# Patient Record
Sex: MALE | Race: WHITE | Employment: FULL TIME | URBAN - METROPOLITAN AREA
[De-identification: names, ages, dates, MRNs, and addresses within clinical notes are randomized per-mention and may not be internally consistent; named-entity substitution may affect disease eponyms.]

---

## 2019-09-26 ENCOUNTER — APPOINTMENT (OUTPATIENT)
Dept: CT IMAGING | Age: 54
End: 2019-09-26
Attending: EMERGENCY MEDICINE
Payer: COMMERCIAL

## 2019-09-26 ENCOUNTER — HOSPITAL ENCOUNTER (EMERGENCY)
Age: 54
Discharge: HOME | End: 2019-09-26
Attending: EMERGENCY MEDICINE | Admitting: EMERGENCY MEDICINE
Payer: COMMERCIAL

## 2019-09-26 VITALS
SYSTOLIC BLOOD PRESSURE: 117 MMHG | HEART RATE: 107 BPM | OXYGEN SATURATION: 99 % | RESPIRATION RATE: 17 BRPM | DIASTOLIC BLOOD PRESSURE: 86 MMHG | TEMPERATURE: 99 F

## 2019-09-26 DIAGNOSIS — S00.83XA CONTUSION OF FACE, INITIAL ENCOUNTER: ICD-10-CM
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
## 2019-09-26 PROCEDURE — 70450 CT HEAD/BRAIN W/O DYE: CPT

## 2019-09-26 PROCEDURE — 74011250637 HC RX REV CODE- 250/637: Performed by: EMERGENCY MEDICINE

## 2019-09-26 PROCEDURE — 99284 EMERGENCY DEPT VISIT MOD MDM: CPT

## 2019-09-26 PROCEDURE — 70480 CT ORBIT/EAR/FOSSA W/O DYE: CPT

## 2019-09-26 RX ORDER — IBUPROFEN 600 MG/1
600 TABLET ORAL
Status: COMPLETED | OUTPATIENT
Start: 2019-09-26 | End: 2019-09-26

## 2019-09-26 RX ADMIN — IBUPROFEN 600 MG: 600 TABLET, FILM COATED ORAL at 16:56

## 2019-09-26 NOTE — DISCHARGE INSTRUCTIONS
Advil, aleve, tylenol  for pain. Keep head elevated for next 48 hours. Place ice in a bag and apply to eyes  for 20 minutes 4-5 times a day for next 48 hours. Return to ER for any redness, warmth, increased swelling, vomiting, dizziness, lightheadedness. Head Injury: Care Instructions  Your Care Instructions    Most injuries to the head are minor. Bumps, cuts, and scrapes on the head and face usually heal well and can be treated the same as injuries to other parts of the body. Although it's rare, once in a while a more serious problem shows up after you are home. So it's good to be on the lookout for symptoms for a day or two. Follow-up care is a key part of your treatment and safety. Be sure to make and go to all appointments, and call your doctor if you are having problems. It's also a good idea to know your test results and keep a list of the medicines you take. How can you care for yourself at home? · Follow your doctor's instructions. He or she will tell you if you need someone to watch you closely for the next 24 hours or longer. · Take it easy for the next few days or more if you are not feeling well. · Ask your doctor when it's okay for you to go back to activities like driving a car, riding a bike, or operating machinery. When should you call for help? Call 911 anytime you think you may need emergency care. For example, call if:    · You have a seizure.     · You passed out (lost consciousness).     · You are confused or can't stay awake.    Call your doctor now or seek immediate medical care if:    · You have new or worse vomiting.     · You feel less alert.     · You have new weakness or numbness in any part of your body.    Watch closely for changes in your health, and be sure to contact your doctor if:    · You do not get better as expected.     · You have new symptoms, such as headaches, trouble concentrating, or changes in mood. Where can you learn more?   Go to http://antonio-wesly.info/. Enter R312 in the search box to learn more about \"Head Injury: Care Instructions. \"  Current as of: March 28, 2019  Content Version: 12.2  © 7938-7250 [employer]. Care instructions adapted under license by [employer] (which disclaims liability or warranty for this information). If you have questions about a medical condition or this instruction, always ask your healthcare professional. [place] [address] any warranty or liability for your use of this information. Bruised Face: Care Instructions  Your Care Instructions    You can get a bruise on your face if you fall or if something hits you in the face. The medical term for a bruise is \"contusion. \" Small blood vessels get torn and leak blood under the skin. Most people think of a bruise as a black-and-blue spot. But bones and muscles can also get bruised. This may damage deep tissues but not cause a bruise you can see. Your doctor will examine you and will gently press on your face to find areas that are tender. He or she will check your eyes, how well you can move face muscles near the bruise, and your feeling around the area to make sure there isn't a more serious injury, such as a broken bone or nerve damage. You may have tests, including X-rays or other imaging tests like a CT scan. Bruises may cause pain and swelling. But if there is no other damage, they will usually get better in a few weeks with home treatment. Follow-up care is a key part of your treatment and safety. Be sure to make and go to all appointments, and call your doctor if you are having problems. It's also a good idea to know your test results and keep a list of the medicines you take. How can you care for yourself at home? · Put ice or a cold pack on your face for 10 to 20 minutes at a time. Put a thin cloth between the ice and your skin.  Try to do this every 1 to 2 hours for the first 3 days (when you are awake) or until the swelling goes down. · Sleep with your head slightly raised until the swelling goes down. Prop up your head and shoulders on pillows. · If you play contact sports, ask your doctor when it's okay to play again. It's safest to wait at least 6 weeks. · Be safe with medicines. Read and follow all instructions on the label. ? If the doctor gave you a prescription medicine for pain, take it as prescribed. ? If you are not taking a prescription pain medicine, ask your doctor if you can take an over-the-counter medicine. When should you call for help? Call your doctor now or seek immediate medical care if:    · Your pain gets worse.     · You have new or worse swelling.     · You have new or worse bleeding.     · The area near the bruise is tingly, weak, or numb.     · You have vision changes.    Watch closely for changes in your health, and be sure to contact your doctor if:    · You do not get better as expected. Where can you learn more? Go to http://antonio-wesly.info/. Enter T266 in the search box to learn more about \"Bruised Face: Care Instructions. \"  Current as of: June 26, 2019  Content Version: 12.2  © 1260-9617 [employer]. Care instructions adapted under license by [employer] (which disclaims liability or warranty for this information). If you have questions about a medical condition or this instruction, always ask your healthcare professional. [place] [address] any warranty or liability for your use of this information.
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
## 2020-02-28 ENCOUNTER — HOSPITAL ENCOUNTER (INPATIENT)
Age: 55
LOS: 5 days | Discharge: HOME | DRG: 316 | End: 2020-03-04
Attending: STUDENT IN AN ORGANIZED HEALTH CARE EDUCATION/TRAINING PROGRAM | Admitting: FAMILY MEDICINE
Payer: MEDICAID

## 2020-02-28 ENCOUNTER — APPOINTMENT (OUTPATIENT)
Dept: VASCULAR SURGERY | Age: 55
DRG: 316 | End: 2020-02-28
Attending: FAMILY MEDICINE
Payer: MEDICAID

## 2020-02-28 ENCOUNTER — APPOINTMENT (OUTPATIENT)
Dept: GENERAL RADIOLOGY | Age: 55
DRG: 316 | End: 2020-02-28
Attending: EMERGENCY MEDICINE
Payer: MEDICAID

## 2020-02-28 DIAGNOSIS — L03.119 CELLULITIS OF HAND: Primary | ICD-10-CM

## 2020-02-28 DIAGNOSIS — M65.9 TENOSYNOVITIS: ICD-10-CM

## 2020-02-28 PROBLEM — L03.90 CELLULITIS: Status: ACTIVE | Noted: 2020-02-28

## 2020-02-28 LAB
ALBUMIN SERPL-MCNC: 4 G/DL (ref 3.5–5)
ALBUMIN/GLOB SERPL: 1.1 {RATIO} (ref 1.1–2.2)
ALP SERPL-CCNC: 101 U/L (ref 45–117)
ALT SERPL-CCNC: 20 U/L (ref 12–78)
ANION GAP SERPL CALC-SCNC: 0 MMOL/L (ref 5–15)
AST SERPL-CCNC: 8 U/L (ref 15–37)
BASOPHILS # BLD: 0 K/UL (ref 0–0.1)
BASOPHILS NFR BLD: 0 % (ref 0–1)
BILIRUB SERPL-MCNC: 1.3 MG/DL (ref 0.2–1)
BUN SERPL-MCNC: 13 MG/DL (ref 6–20)
BUN/CREAT SERPL: 11 (ref 12–20)
CALCIUM SERPL-MCNC: 8.7 MG/DL (ref 8.5–10.1)
CHLORIDE SERPL-SCNC: 104 MMOL/L (ref 97–108)
CO2 SERPL-SCNC: 31 MMOL/L (ref 21–32)
CREAT SERPL-MCNC: 1.17 MG/DL (ref 0.7–1.3)
DIFFERENTIAL METHOD BLD: ABNORMAL
EOSINOPHIL # BLD: 0.1 K/UL (ref 0–0.4)
EOSINOPHIL NFR BLD: 1 % (ref 0–7)
ERYTHROCYTE [DISTWIDTH] IN BLOOD BY AUTOMATED COUNT: 14.9 % (ref 11.5–14.5)
GLOBULIN SER CALC-MCNC: 3.7 G/DL (ref 2–4)
GLUCOSE SERPL-MCNC: 97 MG/DL (ref 65–100)
HCT VFR BLD AUTO: 44.1 % (ref 36.6–50.3)
HGB BLD-MCNC: 14.2 G/DL (ref 12.1–17)
IMM GRANULOCYTES # BLD AUTO: 0.1 K/UL (ref 0–0.04)
IMM GRANULOCYTES NFR BLD AUTO: 1 % (ref 0–0.5)
LYMPHOCYTES # BLD: 1.4 K/UL (ref 0.8–3.5)
LYMPHOCYTES NFR BLD: 12 % (ref 12–49)
MCH RBC QN AUTO: 32.3 PG (ref 26–34)
MCHC RBC AUTO-ENTMCNC: 32.2 G/DL (ref 30–36.5)
MCV RBC AUTO: 100.5 FL (ref 80–99)
MONOCYTES # BLD: 0.8 K/UL (ref 0–1)
MONOCYTES NFR BLD: 6 % (ref 5–13)
NEUTS SEG # BLD: 9.9 K/UL (ref 1.8–8)
NEUTS SEG NFR BLD: 80 % (ref 32–75)
NRBC # BLD: 0 K/UL (ref 0–0.01)
NRBC BLD-RTO: 0 PER 100 WBC
PLATELET # BLD AUTO: 167 K/UL (ref 150–400)
PMV BLD AUTO: 8.8 FL (ref 8.9–12.9)
POTASSIUM SERPL-SCNC: 4.1 MMOL/L (ref 3.5–5.1)
PROT SERPL-MCNC: 7.7 G/DL (ref 6.4–8.2)
RBC # BLD AUTO: 4.39 M/UL (ref 4.1–5.7)
SODIUM SERPL-SCNC: 135 MMOL/L (ref 136–145)
WBC # BLD AUTO: 12.2 K/UL (ref 4.1–11.1)

## 2020-02-28 PROCEDURE — 74011000258 HC RX REV CODE- 258: Performed by: EMERGENCY MEDICINE

## 2020-02-28 PROCEDURE — 36415 COLL VENOUS BLD VENIPUNCTURE: CPT

## 2020-02-28 PROCEDURE — 74011250636 HC RX REV CODE- 250/636: Performed by: FAMILY MEDICINE

## 2020-02-28 PROCEDURE — 74011000258 HC RX REV CODE- 258: Performed by: FAMILY MEDICINE

## 2020-02-28 PROCEDURE — 99283 EMERGENCY DEPT VISIT LOW MDM: CPT

## 2020-02-28 PROCEDURE — 80053 COMPREHEN METABOLIC PANEL: CPT

## 2020-02-28 PROCEDURE — 65270000029 HC RM PRIVATE

## 2020-02-28 PROCEDURE — 87040 BLOOD CULTURE FOR BACTERIA: CPT

## 2020-02-28 PROCEDURE — 74011250636 HC RX REV CODE- 250/636: Performed by: EMERGENCY MEDICINE

## 2020-02-28 PROCEDURE — 93971 EXTREMITY STUDY: CPT

## 2020-02-28 PROCEDURE — 74011250637 HC RX REV CODE- 250/637: Performed by: FAMILY MEDICINE

## 2020-02-28 PROCEDURE — 85025 COMPLETE CBC W/AUTO DIFF WBC: CPT

## 2020-02-28 PROCEDURE — 73130 X-RAY EXAM OF HAND: CPT

## 2020-02-28 RX ORDER — LORAZEPAM 0.5 MG/1
0.5 TABLET ORAL 2 TIMES DAILY
Status: DISCONTINUED | OUTPATIENT
Start: 2020-02-29 | End: 2020-03-04 | Stop reason: HOSPADM

## 2020-02-28 RX ORDER — VANCOMYCIN 1.75 GRAM/500 ML IN 0.9 % SODIUM CHLORIDE INTRAVENOUS
1750 ONCE
Status: COMPLETED | OUTPATIENT
Start: 2020-02-28 | End: 2020-02-29

## 2020-02-28 RX ORDER — SERTRALINE HYDROCHLORIDE 50 MG/1
200 TABLET, FILM COATED ORAL DAILY
Status: DISCONTINUED | OUTPATIENT
Start: 2020-02-29 | End: 2020-03-04 | Stop reason: HOSPADM

## 2020-02-28 RX ORDER — EMTRICITABINE AND TENOFOVIR DISOPROXIL FUMARATE 200; 300 MG/1; MG/1
1 TABLET, FILM COATED ORAL EVERY EVENING
Status: ON HOLD | COMMUNITY
End: 2022-04-02

## 2020-02-28 RX ORDER — MORPHINE SULFATE 2 MG/ML
1 INJECTION, SOLUTION INTRAMUSCULAR; INTRAVENOUS
Status: DISCONTINUED | OUTPATIENT
Start: 2020-02-28 | End: 2020-03-02

## 2020-02-28 RX ORDER — SODIUM CHLORIDE 0.9 % (FLUSH) 0.9 %
5-40 SYRINGE (ML) INJECTION EVERY 8 HOURS
Status: DISCONTINUED | OUTPATIENT
Start: 2020-02-28 | End: 2020-03-04 | Stop reason: HOSPADM

## 2020-02-28 RX ORDER — DEXTROAMPHETAMINE SACCHARATE, AMPHETAMINE ASPARTATE, DEXTROAMPHETAMINE SULFATE AND AMPHETAMINE SULFATE 5; 5; 5; 5 MG/1; MG/1; MG/1; MG/1
20 TABLET ORAL DAILY
COMMUNITY

## 2020-02-28 RX ORDER — DEXTROAMPHETAMINE SACCHARATE, AMPHETAMINE ASPARTATE, DEXTROAMPHETAMINE SULFATE AND AMPHETAMINE SULFATE 2.5; 2.5; 2.5; 2.5 MG/1; MG/1; MG/1; MG/1
20 TABLET ORAL DAILY
Status: DISCONTINUED | OUTPATIENT
Start: 2020-02-29 | End: 2020-03-04 | Stop reason: HOSPADM

## 2020-02-28 RX ORDER — DEXTROAMPHETAMINE SACCHARATE, AMPHETAMINE ASPARTATE, DEXTROAMPHETAMINE SULFATE AND AMPHETAMINE SULFATE 5; 5; 5; 5 MG/1; MG/1; MG/1; MG/1
20 TABLET ORAL
COMMUNITY

## 2020-02-28 RX ORDER — SODIUM CHLORIDE 0.9 % (FLUSH) 0.9 %
5-40 SYRINGE (ML) INJECTION AS NEEDED
Status: DISCONTINUED | OUTPATIENT
Start: 2020-02-28 | End: 2020-03-04 | Stop reason: HOSPADM

## 2020-02-28 RX ORDER — SERTRALINE HYDROCHLORIDE 100 MG/1
200 TABLET, FILM COATED ORAL DAILY
COMMUNITY

## 2020-02-28 RX ORDER — CETIRIZINE HCL 10 MG
10 TABLET ORAL
COMMUNITY

## 2020-02-28 RX ORDER — MORPHINE SULFATE 2 MG/ML
4 INJECTION, SOLUTION INTRAMUSCULAR; INTRAVENOUS
Status: COMPLETED | OUTPATIENT
Start: 2020-02-28 | End: 2020-02-28

## 2020-02-28 RX ORDER — EMTRICITABINE AND TENOFOVIR DISOPROXIL FUMARATE 200; 300 MG/1; MG/1
1 TABLET, FILM COATED ORAL EVERY EVENING
Status: DISCONTINUED | OUTPATIENT
Start: 2020-02-29 | End: 2020-02-29

## 2020-02-28 RX ORDER — ZOLPIDEM TARTRATE 10 MG/1
10 TABLET ORAL
COMMUNITY

## 2020-02-28 RX ORDER — SODIUM CHLORIDE 9 MG/ML
75 INJECTION, SOLUTION INTRAVENOUS CONTINUOUS
Status: DISCONTINUED | OUTPATIENT
Start: 2020-02-28 | End: 2020-03-02

## 2020-02-28 RX ORDER — HEPARIN SODIUM 5000 [USP'U]/ML
5000 INJECTION, SOLUTION INTRAVENOUS; SUBCUTANEOUS EVERY 8 HOURS
Status: DISCONTINUED | OUTPATIENT
Start: 2020-02-28 | End: 2020-03-04 | Stop reason: HOSPADM

## 2020-02-28 RX ORDER — ZOLPIDEM TARTRATE 5 MG/1
10 TABLET ORAL
Status: DISCONTINUED | OUTPATIENT
Start: 2020-02-28 | End: 2020-03-04 | Stop reason: HOSPADM

## 2020-02-28 RX ORDER — ACETAMINOPHEN 325 MG/1
650 TABLET ORAL
Status: DISCONTINUED | OUTPATIENT
Start: 2020-02-28 | End: 2020-03-04 | Stop reason: HOSPADM

## 2020-02-28 RX ORDER — LORAZEPAM 0.5 MG/1
0.5 TABLET ORAL 2 TIMES DAILY
COMMUNITY

## 2020-02-28 RX ADMIN — ZOLPIDEM TARTRATE 10 MG: 5 TABLET ORAL at 23:48

## 2020-02-28 RX ADMIN — CEFEPIME HYDROCHLORIDE 2 G: 2 INJECTION, POWDER, FOR SOLUTION INTRAVENOUS at 22:50

## 2020-02-28 RX ADMIN — ACETAMINOPHEN 650 MG: 325 TABLET ORAL at 23:58

## 2020-02-28 RX ADMIN — MORPHINE SULFATE 4 MG: 2 INJECTION, SOLUTION INTRAMUSCULAR; INTRAVENOUS at 20:23

## 2020-02-28 RX ADMIN — VANCOMYCIN HYDROCHLORIDE 1750 MG: 10 INJECTION, POWDER, LYOPHILIZED, FOR SOLUTION INTRAVENOUS at 23:48

## 2020-02-28 RX ADMIN — HEPARIN SODIUM 5000 UNITS: 5000 INJECTION INTRAVENOUS; SUBCUTANEOUS at 22:50

## 2020-02-28 RX ADMIN — SODIUM CHLORIDE 75 ML/HR: 900 INJECTION, SOLUTION INTRAVENOUS at 23:48

## 2020-02-28 RX ADMIN — PIPERACILLIN AND TAZOBACTAM 3.38 G: 3; .375 INJECTION, POWDER, LYOPHILIZED, FOR SOLUTION INTRAVENOUS at 20:23

## 2020-02-28 NOTE — ED PROVIDER NOTES
71-year-old male medical history significant for HIV and hepatitis presents to the emergency room for evaluation of right hand redness and swelling. Patient states it is been ongoing for 2 days. Patient is unaware of any injury or trauma. He is unaware of any bites to the hand. He is unaware of any fever or chills. No chest pain, shortness breath difficulty breathing. No nausea or vomiting. No numbness or tingling. He does report significant pain. Pain starts in the index finger and shoots up the arm. Patient reports swelling started in the index finger and is spread to the top and bottom of the hand. He is not taking anything for pain. No alleviating factors. Patient is right-hand dominant. Last CD4 count was 300 and last viral load was almost undetectable. Social hx  Nonsmoker  +alcohol      The history is provided by the patient. No  was used. Hand Swelling    Pertinent negatives include no back pain and no neck pain. Past Medical History:   Diagnosis Date    Hepatitis C     HIV infection ([facility]     Psychiatric disorder     depression       Past Surgical History:   Procedure Laterality Date    HX ADENOIDECTOMY           Family History:   Problem Relation Age of Onset    Hypertension Unknown        Social History     Socioeconomic History    Marital status: SINGLE     Spouse name: Not on file    Number of children: Not on file    Years of education: Not on file    Highest education level: Not on file   Occupational History    Not on file   Social Needs    Financial resource strain: Not on file    Food insecurity:     Worry: Not on file     Inability: Not on file    Transportation needs:     Medical: Not on file     Non-medical: Not on file   Tobacco Use    Smoking status: Never Smoker   Substance and Sexual Activity    Alcohol use:  Yes    Drug use: No    Sexual activity: Not on file   Lifestyle    Physical activity:     Days per week: Not on file Minutes per session: Not on file    Stress: Not on file   Relationships    Social connections:     Talks on phone: Not on file     Gets together: Not on file     Attends Latter day service: Not on file     Active member of club or organization: Not on file     Attends meetings of clubs or organizations: Not on file     Relationship status: Not on file    Intimate partner violence:     Fear of current or ex partner: Not on file     Emotionally abused: Not on file     Physically abused: Not on file     Forced sexual activity: Not on file   Other Topics Concern    Not on file   Social History Narrative    Not on file         ALLERGIES: Sulfa (sulfonamide antibiotics)    Review of Systems   Constitutional: Negative for chills and fever. Respiratory: Negative for cough, chest tightness and shortness of breath. Cardiovascular: Negative for chest pain. Gastrointestinal: Negative for abdominal pain, nausea and vomiting. Genitourinary: Negative for dysuria. Musculoskeletal: Negative for back pain and neck pain. Skin: Positive for color change. Negative for rash and wound. Neurological: Negative for headaches. Vitals:    02/28/20 1611   BP: 124/90   Pulse: 97   Resp: 18   Temp: 97.7 °F (36.5 °C)   SpO2: 98%            Physical Exam  Vitals signs and nursing note reviewed. Constitutional:       Appearance: Normal appearance. HENT:      Head: Normocephalic and atraumatic. Nose: Nose normal.      Mouth/Throat:      Mouth: Mucous membranes are moist.      Pharynx: Oropharynx is clear. Eyes:      Conjunctiva/sclera: Conjunctivae normal.      Pupils: Pupils are equal, round, and reactive to light. Neck:      Musculoskeletal: Normal range of motion and neck supple. Cardiovascular:      Rate and Rhythm: Normal rate and regular rhythm. Pulmonary:      Effort: Pulmonary effort is normal. No respiratory distress. Breath sounds: Normal breath sounds.    Musculoskeletal: Normal range of motion. Comments: Right hand:  + redness, warmth and swelling to index and middle  finger dorsum of hand mcp to pip joints and webspace between thumb and index finger, thenar eminence. + lymphangitis   Skin:     General: Skin is warm and dry. Findings: Erythema present. Neurological:      General: No focal deficit present. Mental Status: He is alert and oriented to person, place, and time. Psychiatric:         Mood and Affect: Mood normal.         Behavior: Behavior normal.         Thought Content: Thought content normal.         Judgment: Judgment normal.                  MDM  Number of Diagnoses or Management Options  Cellulitis of hand:   Diagnosis management comments: 48-year-old male HIV positive presenting for right hand redness and swelling. He is swelling over the right hand and flexor surface of the index finger. Patient will need x-ray, labs IV antibiotics admission Ortho consult.    7:02 PM  Spoke with orthopedic attending doctor walker, he will have orthopedic PA evaluate the hand    8:07 PM  Seen by ortho PA. Melissa Dickson. No surgical intervention at this time. Recommends IV antibiotics, admission with hospitalist. Concepción Sandoval will follow. 8:08 PM  Patient is being admitted to the hospital.  The results of their tests and reasons for their admission have been discussed with them and/or available family. They convey agreement and understanding for the need to be admitted and for their admission diagnosis. Consultation will be made now with the inpatient physician for hospitalization. Amount and/or Complexity of Data Reviewed  Discuss the patient with other providers: yes (ER attending-Chase)    Patient Progress  Patient progress: stable         Procedures      8:04 PM    ED Room Number: ER17/17  Patient Name and age:  Derick Oconnell 53 y.o.  male  Working Diagnosis:   1.  Cellulitis of hand      Readmission: no  Isolation Requirements:  no  Recommended Level of Care: med/surg  Code Status:  full  Other: HIV+. cd4 300, viral load almost undetectable. Progressively worsening. Wbc 12. + lymphangitis. Seen by shaniqua hernandez, Pasha Romo  Department:[facility] Adult ED - [phone]           Pt case including HPI, PE, and all available lab and radiology results has been discussed with attending physician. Opportunity to evaluate patient has been provided to ER attending.

## 2020-02-28 NOTE — ED TRIAGE NOTES
Triage: Pt arrives from home with CC of right hand swelling x3 days. Pt reports the swelling started in the second digit and has now spread to the hand and first 3 digits. The hand is red with obvious swelling. Pt reports pain to the extremity. Denies any specific injury or trauma. Took ibuprofen at 10am without relief.

## 2020-02-29 LAB
AMPHET UR QL SCN: POSITIVE
ANION GAP SERPL CALC-SCNC: 6 MMOL/L (ref 5–15)
BARBITURATES UR QL SCN: NEGATIVE
BASOPHILS # BLD: 0 K/UL (ref 0–0.1)
BASOPHILS NFR BLD: 0 % (ref 0–1)
BENZODIAZ UR QL: NEGATIVE
BUN SERPL-MCNC: 13 MG/DL (ref 6–20)
BUN/CREAT SERPL: 12 (ref 12–20)
CALCIUM SERPL-MCNC: 8.7 MG/DL (ref 8.5–10.1)
CANNABINOIDS UR QL SCN: POSITIVE
CHLORIDE SERPL-SCNC: 106 MMOL/L (ref 97–108)
CO2 SERPL-SCNC: 25 MMOL/L (ref 21–32)
COCAINE UR QL SCN: NEGATIVE
CREAT SERPL-MCNC: 1.06 MG/DL (ref 0.7–1.3)
DIFFERENTIAL METHOD BLD: ABNORMAL
DRUG SCRN COMMENT,DRGCM: ABNORMAL
EOSINOPHIL # BLD: 0.1 K/UL (ref 0–0.4)
EOSINOPHIL NFR BLD: 1 % (ref 0–7)
ERYTHROCYTE [DISTWIDTH] IN BLOOD BY AUTOMATED COUNT: 15 % (ref 11.5–14.5)
GLUCOSE BLD STRIP.AUTO-MCNC: 129 MG/DL (ref 65–100)
GLUCOSE SERPL-MCNC: 129 MG/DL (ref 65–100)
HCT VFR BLD AUTO: 40.4 % (ref 36.6–50.3)
HGB BLD-MCNC: 13.1 G/DL (ref 12.1–17)
IMM GRANULOCYTES # BLD AUTO: 0.1 K/UL (ref 0–0.04)
IMM GRANULOCYTES NFR BLD AUTO: 1 % (ref 0–0.5)
LYMPHOCYTES # BLD: 1.9 K/UL (ref 0.8–3.5)
LYMPHOCYTES NFR BLD: 16 % (ref 12–49)
MCH RBC QN AUTO: 32.6 PG (ref 26–34)
MCHC RBC AUTO-ENTMCNC: 32.4 G/DL (ref 30–36.5)
MCV RBC AUTO: 100.5 FL (ref 80–99)
METHADONE UR QL: NEGATIVE
MONOCYTES # BLD: 0.8 K/UL (ref 0–1)
MONOCYTES NFR BLD: 7 % (ref 5–13)
NEUTS SEG # BLD: 9 K/UL (ref 1.8–8)
NEUTS SEG NFR BLD: 75 % (ref 32–75)
NRBC # BLD: 0 K/UL (ref 0–0.01)
NRBC BLD-RTO: 0 PER 100 WBC
OPIATES UR QL: POSITIVE
PCP UR QL: NEGATIVE
PLATELET # BLD AUTO: 132 K/UL (ref 150–400)
PMV BLD AUTO: 8.8 FL (ref 8.9–12.9)
POTASSIUM SERPL-SCNC: 4.1 MMOL/L (ref 3.5–5.1)
RBC # BLD AUTO: 4.02 M/UL (ref 4.1–5.7)
SERVICE CMNT-IMP: ABNORMAL
SODIUM SERPL-SCNC: 137 MMOL/L (ref 136–145)
WBC # BLD AUTO: 12 K/UL (ref 4.1–11.1)

## 2020-02-29 PROCEDURE — 82962 GLUCOSE BLOOD TEST: CPT

## 2020-02-29 PROCEDURE — 80307 DRUG TEST PRSMV CHEM ANLYZR: CPT

## 2020-02-29 PROCEDURE — 74011250636 HC RX REV CODE- 250/636: Performed by: FAMILY MEDICINE

## 2020-02-29 PROCEDURE — 80048 BASIC METABOLIC PNL TOTAL CA: CPT

## 2020-02-29 PROCEDURE — 74011250636 HC RX REV CODE- 250/636: Performed by: INTERNAL MEDICINE

## 2020-02-29 PROCEDURE — 85025 COMPLETE CBC W/AUTO DIFF WBC: CPT

## 2020-02-29 PROCEDURE — 74011000258 HC RX REV CODE- 258: Performed by: FAMILY MEDICINE

## 2020-02-29 PROCEDURE — 77030032497 HC WRP SHLDR WO BGS SOLM -B

## 2020-02-29 PROCEDURE — 74011250637 HC RX REV CODE- 250/637: Performed by: INTERNAL MEDICINE

## 2020-02-29 PROCEDURE — 74011250637 HC RX REV CODE- 250/637: Performed by: FAMILY MEDICINE

## 2020-02-29 PROCEDURE — 36415 COLL VENOUS BLD VENIPUNCTURE: CPT

## 2020-02-29 PROCEDURE — 65270000029 HC RM PRIVATE

## 2020-02-29 RX ORDER — EMTRICITABINE AND TENOFOVIR DISOPROXIL FUMARATE 200; 300 MG/1; MG/1
1 TABLET, FILM COATED ORAL
Status: DISCONTINUED | OUTPATIENT
Start: 2020-02-29 | End: 2020-03-04 | Stop reason: HOSPADM

## 2020-02-29 RX ORDER — METRONIDAZOLE 500 MG/100ML
500 INJECTION, SOLUTION INTRAVENOUS EVERY 12 HOURS
Status: DISCONTINUED | OUTPATIENT
Start: 2020-02-29 | End: 2020-03-04 | Stop reason: HOSPADM

## 2020-02-29 RX ORDER — EMTRICITABINE AND TENOFOVIR DISOPROXIL FUMARATE 200; 300 MG/1; MG/1
1 TABLET, FILM COATED ORAL
Status: DISCONTINUED | OUTPATIENT
Start: 2020-03-01 | End: 2020-02-29

## 2020-02-29 RX ADMIN — ACETAMINOPHEN 650 MG: 325 TABLET ORAL at 12:54

## 2020-02-29 RX ADMIN — MORPHINE SULFATE 1 MG: 2 INJECTION, SOLUTION INTRAMUSCULAR; INTRAVENOUS at 18:59

## 2020-02-29 RX ADMIN — METRONIDAZOLE 500 MG: 500 INJECTION, SOLUTION INTRAVENOUS at 13:04

## 2020-02-29 RX ADMIN — HEPARIN SODIUM 5000 UNITS: 5000 INJECTION INTRAVENOUS; SUBCUTANEOUS at 06:53

## 2020-02-29 RX ADMIN — Medication 10 ML: at 18:59

## 2020-02-29 RX ADMIN — CEFEPIME HYDROCHLORIDE 2 G: 2 INJECTION, POWDER, FOR SOLUTION INTRAVENOUS at 22:47

## 2020-02-29 RX ADMIN — LORAZEPAM 0.5 MG: 0.5 TABLET ORAL at 10:50

## 2020-02-29 RX ADMIN — EMTRICITABINE AND TENOFOVIR DISOPROXIL FUMARATE 1 TABLET: 200; 300 TABLET, FILM COATED ORAL at 23:15

## 2020-02-29 RX ADMIN — MORPHINE SULFATE 1 MG: 2 INJECTION, SOLUTION INTRAMUSCULAR; INTRAVENOUS at 22:56

## 2020-02-29 RX ADMIN — HEPARIN SODIUM 5000 UNITS: 5000 INJECTION INTRAVENOUS; SUBCUTANEOUS at 14:58

## 2020-02-29 RX ADMIN — VANCOMYCIN HYDROCHLORIDE 1000 MG: 1 INJECTION, POWDER, LYOPHILIZED, FOR SOLUTION INTRAVENOUS at 13:08

## 2020-02-29 RX ADMIN — ZOLPIDEM TARTRATE 10 MG: 5 TABLET ORAL at 22:52

## 2020-02-29 RX ADMIN — ACETAMINOPHEN 650 MG: 325 TABLET ORAL at 18:59

## 2020-02-29 RX ADMIN — MORPHINE SULFATE 1 MG: 2 INJECTION, SOLUTION INTRAMUSCULAR; INTRAVENOUS at 00:22

## 2020-02-29 RX ADMIN — DOLUTEGRAVIR SODIUM 50 MG: 50 TABLET, FILM COATED ORAL at 23:16

## 2020-02-29 RX ADMIN — HEPARIN SODIUM 5000 UNITS: 5000 INJECTION INTRAVENOUS; SUBCUTANEOUS at 22:47

## 2020-02-29 RX ADMIN — CEFEPIME HYDROCHLORIDE 2 G: 2 INJECTION, POWDER, FOR SOLUTION INTRAVENOUS at 10:52

## 2020-02-29 RX ADMIN — MORPHINE SULFATE 1 MG: 2 INJECTION, SOLUTION INTRAMUSCULAR; INTRAVENOUS at 07:00

## 2020-02-29 RX ADMIN — Medication 10 ML: at 10:48

## 2020-02-29 RX ADMIN — SERTRALINE HYDROCHLORIDE 200 MG: 50 TABLET ORAL at 10:51

## 2020-02-29 RX ADMIN — MORPHINE SULFATE 1 MG: 2 INJECTION, SOLUTION INTRAMUSCULAR; INTRAVENOUS at 12:50

## 2020-02-29 RX ADMIN — VANCOMYCIN HYDROCHLORIDE 1000 MG: 1 INJECTION, POWDER, LYOPHILIZED, FOR SOLUTION INTRAVENOUS at 23:39

## 2020-02-29 NOTE — ROUTINE PROCESS
TRANSFER - OUT REPORT: 
 
Verbal report given to NUZHAT Gautam(name) on Freddie Gilbert  being transferred to [address] [place] [address](unit) for routine progression of care Report consisted of patients Situation, Background, Assessment and  
Recommendations(SBAR). Information from the following report(s) SBAR, Kardex, Intake/Output, MAR and Recent Results was reviewed with the receiving nurse. Lines:    
 
Opportunity for questions and clarification was provided.

## 2020-02-29 NOTE — PROGRESS NOTES
Clinical Pharmacy Note: Metronidazole Dosing    Please note that the metronidazole dose for Mable Holstein has been changed to 500 mg IV q12h per [facility]-approved protocol. Please contact the pharmacy with any questions.     Noman Zuleta, PHARMD

## 2020-02-29 NOTE — PROGRESS NOTES
Ortho Progress Note    Right hand does not look much different  Continued significant pain  Limited use of right hand secondary to pain/swelling  Will get right hand MRI today to evaluate for abscess  Will make NPO after midnight tonight in case surgery indicated    [facility] JULIETH  Ortho Trauma  Amber  12/29/20  12:08PM

## 2020-02-29 NOTE — PROGRESS NOTES
Admission Medication Reconciliation:    Information obtained from:  Patient   RxQuery data available¹:  NO    Comments/Recommendations: Updated PTA meds/reviewed patient's allergies. 1)  Patient provides medication history    2)  Medication changes (since last review): Added  - Lorazepam, Adderall, Cetirizine, Zolpidem, Tivicay, Truvada    Adjusted  - Sertraline to 200 mg daily    Removed  - Buspirone, Etravirine, Hydrocodone, Combivir, Concerta, Sildenafil     ¹RxQuery pharmacy benefit data reflects medications filled and processed through the patient's insurance, however   this data does NOT capture whether the medication was picked up or is currently being taken by the patient. Allergies:  Sulfa (sulfonamide antibiotics)    Significant PMH/Disease States:   Past Medical History:   Diagnosis Date    Hepatitis C     HIV infection ([facility]     Psychiatric disorder     depression     Chief Complaint for this Admission:    Chief Complaint   Patient presents with    Hand Swelling     Prior to Admission Medications:   Prior to Admission Medications   Prescriptions Last Dose Informant Taking? LORazepam (ATIVAN) 0.5 mg tablet 2/28/2020 at Unknown time  Yes   Sig: Take 0.5 mg by mouth two (2) times a day. cetirizine (ZYRTEC) 10 mg tablet   Yes   Sig: Take 10 mg by mouth nightly. dextroamphetamine-amphetamine (ADDERALL) 20 mg tablet 2/28/2020 at Unknown time  Yes   Sig: Take 20 mg by mouth daily. dextroamphetamine-amphetamine (ADDERALL) 20 mg tablet   Yes   Sig: Take 20 mg by mouth daily as needed. Takes in the evening if necessary   dolutegravir (TIVICAY) 50 mg tab tablet   Yes   Sig: Take 50 mg by mouth every evening.   emtricitabine-tenofovir, TDF, (TRUVADA) 200-300 mg per tablet   Yes   Sig: Take 1 Tab by mouth every evening. sertraline (ZOLOFT) 100 mg tablet 2/28/2020 at Unknown time  Yes   Sig: Take 200 mg by mouth daily. zolpidem (AMBIEN) 10 mg tablet   Yes   Sig: Take 10 mg by mouth nightly. Facility-Administered Medications: None       Please contact the main inpatient pharmacy with any questions or concerns at [phone] and we will direct you to the clinical pharmacist covering this patient's care while in-house.    RAMOS BlancoD

## 2020-02-29 NOTE — PROGRESS NOTES
Pharmacist Note - Vancomycin Dosing    Consult provided for this 53 y.o. male for indication of R hand cellulitis; hx of MRSA  Antibiotic regimen(s): Vanc + Cefepime       Recent Labs     20  1630   WBC 12.2*   CREA 1.17   BUN 13     Frequency of BMP: daily  Height: 70 inches  Weight: 77.8 kg  Est CrCl: ~70 ml/min  Temp (24hrs), Av.3 °F (36.8 °C), Min:97.7 °F (36.5 °C), Max:98.8 °F (37.1 °C)    Goal trough = 10 - 15 mcg/mL    A 1750 mg loading dose was ordered in the ED; to be followed by a maintenance dose of 1000 mg IV every 12 hours. Pharmacy to follow patient daily and order levels / make dose adjustments as appropriate.

## 2020-02-29 NOTE — PROGRESS NOTES
Bedside and Verbal shift change report given to Manuela Jordan RN (oncoming nurse) by Nakia Gurrola RN (offgoing nurse). Report included the following information SBAR, Kardex and MAR.

## 2020-02-29 NOTE — PROGRESS NOTES
Cefepime dose adjustment  Indication:  skinand soft tissue infection  Current regimen:  2gm q8h x 7 days  Abx regimen: vancomycin  Recent Labs     20  1630   WBC 12.2*   CREA 1.17   BUN 13     Est CrCl: 75 ml/min  Temp (24hrs), Av.3 °F (36.8 °C), Min:97.7 °F (36.5 °C), Max:98.8 °F (37.1 °C)    Cultures: pending    Plan: Change to 2gm q12h for SSTI

## 2020-02-29 NOTE — CONSULTS
ORTHO CONSULT NOTE    Subjective:     Date of Consultation:  February 28, 2020    Ness Cardenas is a 53 y.o. male who is being seen for right hand pain and swelling. Symptoms began 2 days ago. He has been biting his fingernails and started developing redness and swelling in the index finger near the nail. Swelling progressed to involve the entire index finger and has now spread to the palm. The dorsal aspect of the hand is also involved. Pain has been steadily getting worse. He denies fever/chills. Xrays are normal. WBC is normal. Past medical history is significant for HIV, Hepatitis C, and depression/anxiety. He has also had an abscess in the right forearm in the past necessitating surgical I&D (Dr. Fracisco Early, 2012). Patient Active Problem List    Diagnosis Date Noted    Cellulitis 02/28/2020     Family History   Problem Relation Age of Onset    Hypertension Unknown       Social History     Tobacco Use    Smoking status: Never Smoker   Substance Use Topics    Alcohol use: Yes     Past Medical History:   Diagnosis Date    Hepatitis C     HIV infection ([facility] [address]     Psychiatric disorder     depression      Past Surgical History:   Procedure Laterality Date    HX ADENOIDECTOMY        Prior to Admission medications    Medication Sig Start Date End Date Taking? Authorizing Provider   sertraline (ZOLOFT) 100 mg tablet Take 200 mg by mouth daily. Yes Provider, Historical   LORazepam (ATIVAN) 0.5 mg tablet Take 0.5 mg by mouth two (2) times a day. Yes Provider, Historical   dextroamphetamine-amphetamine (ADDERALL) 20 mg tablet Take 20 mg by mouth daily. Yes Provider, Historical   dextroamphetamine-amphetamine (ADDERALL) 20 mg tablet Take 20 mg by mouth daily as needed. Takes in the evening if necessary   Yes Provider, Historical   cetirizine (ZYRTEC) 10 mg tablet Take 10 mg by mouth nightly. Yes Provider, Historical   zolpidem (AMBIEN) 10 mg tablet Take 10 mg by mouth nightly.    Yes Provider, Historical dolutegravir (TIVICAY) 50 mg tab tablet Take 50 mg by mouth every evening. Yes Provider, Historical   emtricitabine-tenofovir, TDF, (TRUVADA) 200-300 mg per tablet Take 1 Tab by mouth every evening. Yes Provider, Historical     Current Facility-Administered Medications   Medication Dose Route Frequency    piperacillin-tazobactam (ZOSYN) 3.375 g in 0.9% sodium chloride (MBP/ADV) 100 mL  3.375 g IntraVENous NOW     Current Outpatient Medications   Medication Sig    sertraline (ZOLOFT) 100 mg tablet Take 200 mg by mouth daily.  LORazepam (ATIVAN) 0.5 mg tablet Take 0.5 mg by mouth two (2) times a day.  dextroamphetamine-amphetamine (ADDERALL) 20 mg tablet Take 20 mg by mouth daily.  dextroamphetamine-amphetamine (ADDERALL) 20 mg tablet Take 20 mg by mouth daily as needed. Takes in the evening if necessary    cetirizine (ZYRTEC) 10 mg tablet Take 10 mg by mouth nightly.  zolpidem (AMBIEN) 10 mg tablet Take 10 mg by mouth nightly.  dolutegravir (TIVICAY) 50 mg tab tablet Take 50 mg by mouth every evening.  emtricitabine-tenofovir, TDF, (TRUVADA) 200-300 mg per tablet Take 1 Tab by mouth every evening. Allergies   Allergen Reactions    Sulfa (Sulfonamide Antibiotics) Hives     Pt states he got hives as a child        Review of Systems:  A comprehensive review of systems was negative except for that written in the HPI.     Mental Status: no dementia    Objective:     Patient Vitals for the past 8 hrs:   BP Temp Pulse Resp SpO2 Weight   20      77.8 kg (171 lb 8.3 oz)   20 1611 124/90 97.7 °F (36.5 °C) 97 18 98 %      Temp (24hrs), Av.7 °F (36.5 °C), Min:97.7 °F (36.5 °C), Max:97.7 °F (36.5 °C)      PHYSICAL EXAM:   General: 50yo male, pleasant/cooperative, appears stated age, complains of right hand pain  CNS: alert/oriented, somewhat anxious, answers questions appropriately  Skin: right index finger swollen/tense, erythema involving entire index and proximal long finger, swelling and erythema over the radial aspect of the palm primarily over metacarpal heads, thenar area red but non-tender, dorsum of the hand swollen from thumb to long finger and spreading proximally to the wrist  Extremities: right hand as above (see images), limited use of index finger due to pain and swelling, unable to flex index, entire hand is warm to touch  Vascular: strong radial pulse  Neurologic: except for index finger, motor function in the hand is well maintained despite swelling, sensation intact throughout right hand    Media Information      Document Information     Mobile Media Capture      02/28/2020 19:40   Attached To: Hospital Encounter on 2/28/20   Source Information     Ta Courtney  [facility]           Imaging Review:   XR HAND RT MIN 3 V 2/28/2020 18:48  INDICATION: redness, pain. COMPARISON: None. FINDINGS: Three views of the right hand demonstrate no fracture or other acute  osseous or articular abnormality. The soft tissues are within normal limits. IMPRESSION: No acute abnormality    Labs:   Recent Results (from the past 24 hour(s))   CBC WITH AUTOMATED DIFF    Collection Time: 02/28/20  4:30 PM   Result Value Ref Range    WBC 12.2 (H) 4.1 - 11.1 K/uL    RBC 4.39 4. 10 - 5.70 M/uL    HGB 14.2 12.1 - 17.0 g/dL    HCT 44.1 36.6 - 50.3 %    .5 (H) 80.0 - 99.0 FL    MCH 32.3 26.0 - 34.0 PG    MCHC 32.2 30.0 - 36.5 g/dL    RDW 14.9 (H) 11.5 - 14.5 %    PLATELET 425 981 - 619 K/uL    MPV 8.8 (L) 8.9 - 12.9 FL    NRBC 0.0 0  WBC    ABSOLUTE NRBC 0.00 0.00 - 0.01 K/uL    NEUTROPHILS 80 (H) 32 - 75 %    LYMPHOCYTES 12 12 - 49 %    MONOCYTES 6 5 - 13 %    EOSINOPHILS 1 0 - 7 %    BASOPHILS 0 0 - 1 %    IMMATURE GRANULOCYTES 1 (H) 0.0 - 0.5 %    ABS. NEUTROPHILS 9.9 (H) 1.8 - 8.0 K/UL    ABS. LYMPHOCYTES 1.4 0.8 - 3.5 K/UL    ABS. MONOCYTES 0.8 0.0 - 1.0 K/UL    ABS. EOSINOPHILS 0.1 0.0 - 0.4 K/UL    ABS. BASOPHILS 0.0 0.0 - 0.1 K/UL    ABS. IMM. GRANS. 0.1 (H) 0.00 - 0.04 K/UL    DF AUTOMATED     METABOLIC PANEL, COMPREHENSIVE    Collection Time: 02/28/20  4:30 PM   Result Value Ref Range    Sodium 135 (L) 136 - 145 mmol/L    Potassium 4.1 3.5 - 5.1 mmol/L    Chloride 104 97 - 108 mmol/L    CO2 31 21 - 32 mmol/L    Anion gap 0 (L) 5 - 15 mmol/L    Glucose 97 65 - 100 mg/dL    BUN 13 6 - 20 MG/DL    Creatinine 1.17 0.70 - 1.30 MG/DL    BUN/Creatinine ratio 11 (L) 12 - 20      GFR est AA >60 >60 ml/min/1.73m2    GFR est non-AA >60 >60 ml/min/1.73m2    Calcium 8.7 8.5 - 10.1 MG/DL    Bilirubin, total 1.3 (H) 0.2 - 1.0 MG/DL    ALT (SGPT) 20 12 - 78 U/L    AST (SGOT) 8 (L) 15 - 37 U/L    Alk. phosphatase 101 45 - 117 U/L    Protein, total 7.7 6.4 - 8.2 g/dL    Albumin 4.0 3.5 - 5.0 g/dL    Globulin 3.7 2.0 - 4.0 g/dL    A-G Ratio 1.1 1.1 - 2.2           Impression:     Patient Active Problem List    Diagnosis Date Noted    Cellulitis 02/28/2020     Active Problems:    Cellulitis (2/28/2020)        Plan:     Needs admission for IV antibiotics. If he does not respond, would recommend further evaluation with MRI to rule out drainable abscess. Discussed with patient and attending orthopedist (Dr. Shashi Johnson). Discussed with hand surgeon as well (Dr. Robbi Guardado). Strict ice and elevation. Will follow.       ISSA Mon

## 2020-02-29 NOTE — H&P
[address]  HISTORY AND PHYSICAL    Name:  Bre Arredondo  MR#:  [id number]  :  1965  ACCOUNT #:  [de-identified]  ADMIT DATE:  2020      CHIEF COMPLAINT:  Right hand swelling. HISTORY OF PRESENT ILLNESS:  The patient is a 59-year-old male with past medical history of HIV and hepatitis, who presents to the hospital with the above-mentioned symptom. The patient came in with the right hand redness and swelling. The patient reports that he was chewing his nails at the side of the index finger and had somewhat skin break post that, this he had 2 days back. Does not have any recollection of any injury or trauma to the hand besides this. Reports that he started experiencing swelling, redness, and tenderness of his right hand. The hand was tight and he was not able to make a fist.  He reports that he does not have any insect bites or injuries that he can recollect. The patient reports that the swelling got worse today. He got some chills and decided to come to the hospital.  In the ER, he was evaluated. Ortho saw the patient and requested the patient to be admitted to the hospitalist service. The patient denies any headache, blurry vision, sore throat, trouble swallowing, trouble with speech, any chest pain, shortness of breath, cough, fever, chills, abdominal pain, constipation, diarrhea, urinary symptoms, focal or generalized neurological weakness, recent travel, sick contacts, falls, injuries, hematemesis, melena, hemoptysis, hematuria, or any other concerns or problems. The patient has a history of HIV and has been compliant with his medications. The patient reports his last viral load was undetectable and his CD4 count was greater than 300. The patient denies any other complaints or problems. PAST MEDICAL HISTORY:  See above. HOME MEDICATIONS:  Currently, the patient is on:  1. Sertraline 200 mg daily. 2.  Lorazepam 0.5 mg b.i.d.  3.  Adderall 20 mg daily.   4. Cetirizine 10 mg daily. 5.  Ambien 10 mg nightly. 6.  Tivicay 50 mg every evening. 7.  Truvada 1 tablet every evening. ALLERGIES:  SULFA. SOCIAL HISTORY:  Denies tobacco abuse. There is no alcohol or IV drug abuse. FAMILY HISTORY:  There is a strong family history of hypertension. REVIEW OF SYMPTOMS:  All systems were reviewed and found to be essentially negative except for the symptoms mentioned above. PHYSICAL EXAMINATION:  VITAL SIGNS:  Temperature 98.8, pulse 83, respiratory rate 18, blood pressure 106/75, pulse oximetry 94% on room air. GENERAL:  Alert x3, awake, mildly distressed, pleasant male, appears to be stated age. HEENT:  Pupils are equal and reactive to light. Dry mucous membranes. Tympanic membranes are clear. NECK:  Supple. CHEST:  Clear to auscultation bilaterally. CORONARY:  S1 and S2 are heard. ABDOMEN:  Soft, nontender, nondistended. Bowel sounds are physiological.  EXTREMITIES:  No clubbing, no cyanosis. The right upper extremity shows significant swelling and edema associated with warmth and tenderness. Swelling extends from the digits up to the distal one-third of the forearm. NEURO/PSYCH:  Pleasant mood and affect. Cranial nerves II through XII are grossly intact. Sensory is grossly within normal limits. Strength is 5/5. Moves all 4. SKIN:  Warm and as above. LABORATORY DATA:  White count 12.2, hemoglobin 14.2, hematocrit 44.1, platelets 874. Sodium 135, potassium 4.1, chloride 112, bicarbonate 35, anion gap 8, glucose 97, BUN 13, creatinine 1.17, calcium 8.7. Bilirubin total 1.3, ALT 20, AST 8, alkaline phosphatase 101. Blood cultures are pending. X-ray of the hand shows no acute abnormality. ASSESSMENT AND PLAN:  1. Cellulitis of the right hand. The patient will be admitted on an ortho bed.   Start the patient on broad-spectrum IV antibiotics, blood cultures, appreciate Orthopedics input, pain control, elevation, supportive care, close monitoring, further intervention per hospital course, IV hydration, and reassess as needed. May consider getting Infectious Diseases consult in the morning if persists. Continue to monitor. 2.  History of human immunodeficiency viruses. Continue home medications and continue to monitor. Further intervention per hospital course. The patient is immunocompromised. 3.  Hepatitis C. Continue home medications. Continue to monitor. Stable liver enzymes. 4.  Gastrointestinal and deep venous thrombosis prophylaxis. The patient will be on heparin.         Zohaib Marcelino MD      [id number]  D:  02/28/2020 21:18  T:  02/28/2020 23:11  JOB #:  [id number]

## 2020-02-29 NOTE — PROGRESS NOTES
[address] [facility]  Hospitalist Group                                                                                          Hospitalist Progress Note  Joanna Hinds MD  Answering service: [phone] or [phone] from in house phone        Date of Service:  2020  NAME:  Mable Holstein  :  1965  MRN:  [id number]      Admission Summary:   The patient is a 76-year-old male with past medical history of HIV and hepatitis, who presents to the hospital with the above-mentioned symptom. The patient came in with the right hand redness and swelling. The patient reports that he was chewing his nails at the side of the index finger and had somewhat skin break post that, this he had 2 days back. Does not have any recollection of any injury or trauma to the hand besides this. Reports that he started experiencing swelling, redness, and tenderness of his right hand. The hand was tight and he was not able to make a fist.  He reports that he does not have any insect bites or injuries that he can recollect. Interval history / Subjective:   Patient does not note very much improvement in his hand swelling. Continues to have significant pain with movement. Does not remember any inciting events, but does note that he frequently chews his nails, and that this infection seem to have started around his index finger nail bed  This morning I did add Flagyl as anaerobic coverage. Assessment & Plan:     Cellulitis of the right hand -suspect there is also tenosynovitis  -MRI of the right hand is ordered to rule out abscess  -Will go ahead and marked the area of erythema today  -Orthopedics consulted, appreciate recommendations  -Infectious disease consultation will be requested  -Continue IV vancomycin, IV cefepime, IV Flagyl was added to morning  -Check UDS    HIV -unknown last CD4 count.   Per patient is somewhere between 200-300  -Continue Truvada, Tivicay  -Infectious disease consulted as above    Hepatitis C- unknown if this is been treated before  -Infectious disease already following, will check with their office to see if any RNA or viral loads have been done recently before ordering    Depression- continue SSRI  ? ADHD- continue Adderall    Code status: Full  DVT prophylaxis: Lovenox (tomorrow)    Care Plan discussed with: Patient/Family  Anticipated Disposition: Home w/Family  Anticipated Discharge: Less than 24 hours     Hospital Problems  Date Reviewed: 3/12/2012          Codes Class Noted POA    Cellulitis ICD-10-CM: L03.90  ICD-9-CM: 682.9  2/28/2020 Unknown                Review of Systems:   A comprehensive review of systems was negative except for that written in the HPI. Vital Signs:    Last 24hrs VS reviewed since prior progress note. Most recent are:  Visit Vitals  /87 (BP 1 Location: Left arm, BP Patient Position: At rest)   Pulse 69   Temp 98.4 °F (36.9 °C)   Resp 16   Wt 77.8 kg (171 lb 8.3 oz)   SpO2 96%   BMI 24.61 kg/m²         Intake/Output Summary (Last 24 hours) at 2/29/2020 1458  Last data filed at 2/29/2020 0346  Gross per 24 hour   Intake    Output 300 ml   Net -300 ml        Physical Examination:             Constitutional:  No acute distress, cooperative, pleasant. ENT:  Oral mucosa moist, oropharynx benign. Resp:  CTA bilaterally. No wheezing/rhonchi/rales. No accessory muscle use   CV:  Regular rhythm, normal rate, no murmurs, gallops, rubs    GI:  Soft, non distended, non tender. normoactive bowel sounds, no hepatosplenomegaly     Musculoskeletal:  R hand with significant swelling, erythema, worst over the 2nd, 3rd digits. No obvious abscess noted. Neurologic:  Moves all extremities. AAOx3, CN II-XII reviewed     Psych:  Good insight, Not anxious nor agitated.        Data Review:    Review and/or order of clinical lab test  Review and/or order of tests in the radiology section of CPT  Review and/or order of tests in the medicine section of CPT      Labs:     Recent Labs     02/29/20  0353 02/28/20  1630   WBC 12.0* 12.2*   HGB 13.1 14.2   HCT 40.4 44.1   * 167     Recent Labs     02/29/20  0353 02/28/20  1630    135*   K 4.1 4.1    104   CO2 25 31   BUN 13 13   CREA 1.06 1.17   * 97   CA 8.7 8.7     Recent Labs     02/28/20  1630   SGOT 8*   ALT 20      TBILI 1.3*   TP 7.7   ALB 4.0   GLOB 3.7     No results for input(s): INR, PTP, APTT, INREXT in the last 72 hours. No results for input(s): FE, TIBC, PSAT, FERR in the last 72 hours. No results found for: FOL, RBCF   No results for input(s): PH, PCO2, PO2 in the last 72 hours. No results for input(s): CPK, CKNDX, TROIQ in the last 72 hours.     No lab exists for component: CPKMB  No results found for: CHOL, CHOLX, CHLST, CHOLV, HDL, HDLP, LDL, LDLC, DLDLP, TGLX, TRIGL, TRIGP, CHHD, CHHDX  No results found for: GLUCPOC  No results found for: COLOR, APPRN, SPGRU, REFSG, JESUS, PROTU, GLUCU, KETU, BILU, UROU, STEPHANIE, LEUKU, GLUKE, EPSU, BACTU, WBCU, RBCU, CASTS, UCRY      Medications Reviewed:     Current Facility-Administered Medications   Medication Dose Route Frequency    metroNIDAZOLE (FLAGYL) IVPB premix 500 mg  500 mg IntraVENous Q12H    dextroamphetamine-amphetamine (ADDERALL) tablet 20 mg  20 mg Oral DAILY    dolutegravir (TIVICAY) tablet 50 mg  50 mg Oral QPM    emtricitabine-tenofovir (TDF) (TRUVADA) 200-300 mg per tablet 1 Tab  1 Tab Oral QPM    LORazepam (ATIVAN) tablet 0.5 mg  0.5 mg Oral BID    sertraline (ZOLOFT) tablet 200 mg  200 mg Oral DAILY    zolpidem (AMBIEN) tablet 10 mg  10 mg Oral QHS    sodium chloride (NS) flush 5-40 mL  5-40 mL IntraVENous Q8H    sodium chloride (NS) flush 5-40 mL  5-40 mL IntraVENous PRN    0.9% sodium chloride infusion  75 mL/hr IntraVENous CONTINUOUS    acetaminophen (TYLENOL) tablet 650 mg  650 mg Oral Q4H PRN    heparin (porcine) injection 5,000 Units  5,000 Units SubCUTAneous Q8H    cefepime (MAXIPIME) 2 g in 0.9% sodium chloride (MBP/ADV) 100 mL  2 g IntraVENous Q12H    morphine injection 1 mg  1 mg IntraVENous Q4H PRN    Vancomycin- pharmacy to dose   Other Rx Dosing/Monitoring    vancomycin (VANCOCIN) 1,000 mg in 0.9% sodium chloride (MBP/ADV) 250 mL  1,000 mg IntraVENous Q12H     ______________________________________________________________________  EXPECTED LENGTH OF STAY: - - -  ACTUAL LENGTH OF STAY:          1                 Padilla Rosales MD

## 2020-02-29 NOTE — CONSULTS
ID Consult Note  NAME:  Aleena Flores   :   1965   MRN:   [id number]   Date/Time:  2020 12:44 PM  Subjective:   REASON FOR CONSULT:   Right hand infection  Ninfa Desouza is a 53 y.o. with a history of HIV and hepatitis C. He says that his CD4 count is above 200 but less than 300. He says he is almost undetectable. 3 days ago, he developed some redness and swelling of the right index finger. He tells me he has a habit of chewing his fingers. There was some associated pain which he said was not severe. It started radiating to his palm the following day. The redness also started spreading proximally. There were no aggravating or alleviating symptoms. He does have a pet dog at his home. The dog did not bite him. He did not have any fever or any chills there were no aggravating or alleviating factors. The persistence of his symptoms made him go to the emergency room where he was diagnosed to have cellulitis. He was given antibiotics. He was already seen by orthopedics and there is a plan to do a MRI on him. Past Medical History:   Diagnosis Date    Hepatitis C     HIV infection ([facility] [address]     Psychiatric disorder     depression   Syphilis    Past Surgical History:   Procedure Laterality Date    HX ADENOIDECTOMY     Oral surgery  Sinus surgery    Social History     Tobacco Use    Smoking status: Never Smoker   Substance Use Topics    Alcohol use: Yes   No drug use    Family history  His parents are healthy. Allergies   Allergen Reactions    Sulfa (Sulfonamide Antibiotics) Hives     Pt states he got hives as a child      Home Medications:  Prior to Admission Medications   Prescriptions Last Dose Informant Patient Reported? Taking? LORazepam (ATIVAN) 0.5 mg tablet 2020 at Unknown time  Yes Yes   Sig: Take 0.5 mg by mouth two (2) times a day. cetirizine (ZYRTEC) 10 mg tablet   Yes Yes   Sig: Take 10 mg by mouth nightly.    dextroamphetamine-amphetamine (ADDERALL) 20 mg tablet 2020 at Unknown time  Yes Yes   Sig: Take 20 mg by mouth daily. dextroamphetamine-amphetamine (ADDERALL) 20 mg tablet   Yes Yes   Sig: Take 20 mg by mouth daily as needed. Takes in the evening if necessary   dolutegravir (TIVICAY) 50 mg tab tablet   Yes Yes   Sig: Take 50 mg by mouth every evening.   emtricitabine-tenofovir, TDF, (TRUVADA) 200-300 mg per tablet   Yes Yes   Sig: Take 1 Tab by mouth every evening. sertraline (ZOLOFT) 100 mg tablet 2/28/2020 at Unknown time  Yes Yes   Sig: Take 200 mg by mouth daily. zolpidem (AMBIEN) 10 mg tablet   Yes Yes   Sig: Take 10 mg by mouth nightly.       Facility-Administered Medications: None     Hospital medications:  Current Facility-Administered Medications   Medication Dose Route Frequency    metroNIDAZOLE (FLAGYL) IVPB premix 500 mg  500 mg IntraVENous Q12H    dextroamphetamine-amphetamine (ADDERALL) tablet 20 mg  20 mg Oral DAILY    dolutegravir (TIVICAY) tablet 50 mg  50 mg Oral QPM    emtricitabine-tenofovir (TDF) (TRUVADA) 200-300 mg per tablet 1 Tab  1 Tab Oral QPM    LORazepam (ATIVAN) tablet 0.5 mg  0.5 mg Oral BID    sertraline (ZOLOFT) tablet 200 mg  200 mg Oral DAILY    zolpidem (AMBIEN) tablet 10 mg  10 mg Oral QHS    sodium chloride (NS) flush 5-40 mL  5-40 mL IntraVENous Q8H    sodium chloride (NS) flush 5-40 mL  5-40 mL IntraVENous PRN    0.9% sodium chloride infusion  75 mL/hr IntraVENous CONTINUOUS    acetaminophen (TYLENOL) tablet 650 mg  650 mg Oral Q4H PRN    heparin (porcine) injection 5,000 Units  5,000 Units SubCUTAneous Q8H    cefepime (MAXIPIME) 2 g in 0.9% sodium chloride (MBP/ADV) 100 mL  2 g IntraVENous Q12H    morphine injection 1 mg  1 mg IntraVENous Q4H PRN    Vancomycin- pharmacy to dose   Other Rx Dosing/Monitoring    vancomycin (VANCOCIN) 1,000 mg in 0.9% sodium chloride (MBP/ADV) 250 mL  1,000 mg IntraVENous Q12H     REVIEW OF SYSTEMS:        Const:    negative weight loss  Eyes:   negative diplopia or visual changes, negative eye pain  ENT:   negative coryza, negative sore throat  Resp:   negative cough, hemoptysis, dyspnea  Cards:  negative for chest pain, palpitations  :  negative for frequency, dysuria and hematuria  Skin:   negative for rash and pruritus  Heme:   negative for easy bruising and gum/nose bleeding  MS:  negative for myalgias, arthralgias  Neurolo:  negative for headaches, dizziness  Psych:  negative for hallucinations  Objective:   VITALS:    Visit Vitals  /88 (BP 1 Location: Left arm, BP Patient Position: At rest)   Pulse 76   Temp 97.9 °F (36.6 °C)   Resp 16   Wt 77.8 kg (171 lb 8.3 oz)   SpO2 95%   BMI 24.61 kg/m²     Temp (24hrs), Av °F (36.7 °C), Min:97.5 °F (36.4 °C), Max:98.8 °F (37.1 °C)    PHYSICAL EXAM:   General:    Alert, cooperative, no distress, appears stated age. Head:   Normocephalic, without obvious abnormality, atraumatic. Eyes:   Conjunctivae clear, anicteric sclerae. Nose:  Nares normal.   Throat:    Lips and tongue normal.    Neck:  Supple    no carotid bruit and no JVD. :    No CVA tenderness, no wright catheter  Lungs:   Clear to auscultation bilaterally. No Wheezing or Rhonchi. No rales. Heart:   Regular rate and rhythm,  no murmur, rub or gallop. Abdomen:   Soft, non-tender,not distended. Bowel sounds normal.   Extremities: The right hand is red. The erythema stretches from the tip of the index finger to the distal forearm. The right index finger is swollen. It is tender to palpation. He cannot really flex it. The right palm is erythematous and tender. Skin:     No rashes or lesions.   Not Jaundiced  Lymph: Cervical, supraclavicular normal.  Neurologic: Full use of his extraocular muscles, tongue midline, no facial asymmetry, muscle strength is 5 out of 5    LAB DATA REVIEWED:    Recent Results (from the past 48 hour(s))   CBC WITH AUTOMATED DIFF    Collection Time: 20  4:30 PM   Result Value Ref Range    WBC 12.2 (H) 4.1 - 11.1 K/uL    RBC 4.39 4.10 - 5.70 M/uL    HGB 14.2 12.1 - 17.0 g/dL    HCT 44.1 36.6 - 50.3 %    .5 (H) 80.0 - 99.0 FL    MCH 32.3 26.0 - 34.0 PG    MCHC 32.2 30.0 - 36.5 g/dL    RDW 14.9 (H) 11.5 - 14.5 %    PLATELET 758 810 - 827 K/uL    MPV 8.8 (L) 8.9 - 12.9 FL    NRBC 0.0 0  WBC    ABSOLUTE NRBC 0.00 0.00 - 0.01 K/uL    NEUTROPHILS 80 (H) 32 - 75 %    LYMPHOCYTES 12 12 - 49 %    MONOCYTES 6 5 - 13 %    EOSINOPHILS 1 0 - 7 %    BASOPHILS 0 0 - 1 %    IMMATURE GRANULOCYTES 1 (H) 0.0 - 0.5 %    ABS. NEUTROPHILS 9.9 (H) 1.8 - 8.0 K/UL    ABS. LYMPHOCYTES 1.4 0.8 - 3.5 K/UL    ABS. MONOCYTES 0.8 0.0 - 1.0 K/UL    ABS. EOSINOPHILS 0.1 0.0 - 0.4 K/UL    ABS. BASOPHILS 0.0 0.0 - 0.1 K/UL    ABS. IMM. GRANS. 0.1 (H) 0.00 - 0.04 K/UL    DF AUTOMATED     METABOLIC PANEL, COMPREHENSIVE    Collection Time: 02/28/20  4:30 PM   Result Value Ref Range    Sodium 135 (L) 136 - 145 mmol/L    Potassium 4.1 3.5 - 5.1 mmol/L    Chloride 104 97 - 108 mmol/L    CO2 31 21 - 32 mmol/L    Anion gap 0 (L) 5 - 15 mmol/L    Glucose 97 65 - 100 mg/dL    BUN 13 6 - 20 MG/DL    Creatinine 1.17 0.70 - 1.30 MG/DL    BUN/Creatinine ratio 11 (L) 12 - 20      GFR est AA >60 >60 ml/min/1.73m2    GFR est non-AA >60 >60 ml/min/1.73m2    Calcium 8.7 8.5 - 10.1 MG/DL    Bilirubin, total 1.3 (H) 0.2 - 1.0 MG/DL    ALT (SGPT) 20 12 - 78 U/L    AST (SGOT) 8 (L) 15 - 37 U/L    Alk.  phosphatase 101 45 - 117 U/L    Protein, total 7.7 6.4 - 8.2 g/dL    Albumin 4.0 3.5 - 5.0 g/dL    Globulin 3.7 2.0 - 4.0 g/dL    A-G Ratio 1.1 1.1 - 2.2     CULTURE, BLOOD, PAIRED    Collection Time: 02/28/20  6:38 PM   Result Value Ref Range    Special Requests: NO SPECIAL REQUESTS      Culture result: NO GROWTH AFTER 10 HOURS     METABOLIC PANEL, BASIC    Collection Time: 02/29/20  3:53 AM   Result Value Ref Range    Sodium 137 136 - 145 mmol/L    Potassium 4.1 3.5 - 5.1 mmol/L    Chloride 106 97 - 108 mmol/L    CO2 25 21 - 32 mmol/L    Anion gap 6 5 - 15 mmol/L    Glucose 129 (H) 65 - 100 mg/dL    BUN 13 6 - 20 MG/DL    Creatinine 1.06 0.70 - 1.30 MG/DL    BUN/Creatinine ratio 12 12 - 20      GFR est AA >60 >60 ml/min/1.73m2    GFR est non-AA >60 >60 ml/min/1.73m2    Calcium 8.7 8.5 - 10.1 MG/DL   CBC WITH AUTOMATED DIFF    Collection Time: 02/29/20  3:53 AM   Result Value Ref Range    WBC 12.0 (H) 4.1 - 11.1 K/uL    RBC 4.02 (L) 4.10 - 5.70 M/uL    HGB 13.1 12.1 - 17.0 g/dL    HCT 40.4 36.6 - 50.3 %    .5 (H) 80.0 - 99.0 FL    MCH 32.6 26.0 - 34.0 PG    MCHC 32.4 30.0 - 36.5 g/dL    RDW 15.0 (H) 11.5 - 14.5 %    PLATELET 462 (L) 408 - 400 K/uL    MPV 8.8 (L) 8.9 - 12.9 FL    NRBC 0.0 0  WBC    ABSOLUTE NRBC 0.00 0.00 - 0.01 K/uL    NEUTROPHILS 75 32 - 75 %    LYMPHOCYTES 16 12 - 49 %    MONOCYTES 7 5 - 13 %    EOSINOPHILS 1 0 - 7 %    BASOPHILS 0 0 - 1 %    IMMATURE GRANULOCYTES 1 (H) 0.0 - 0.5 %    ABS. NEUTROPHILS 9.0 (H) 1.8 - 8.0 K/UL    ABS. LYMPHOCYTES 1.9 0.8 - 3.5 K/UL    ABS. MONOCYTES 0.8 0.0 - 1.0 K/UL    ABS. EOSINOPHILS 0.1 0.0 - 0.4 K/UL    ABS. BASOPHILS 0.0 0.0 - 0.1 K/UL    ABS. IMM. GRANS. 0.1 (H) 0.00 - 0.04 K/UL    DF AUTOMATED       IMPRESSIOn    Right hand cellulitis     HIV    Hepatitis C    Plan    I agree with doing an MRI to evaluate for an abscess. I would continue vancomycin, cefepime and Flagyl at this time. Continue Truvada and Tivicay for his HIV. He tells me that his blood work was done about 4 weeks ago.   This can be checked at our office once it opens on Monday.       ___________________________________________________  ID: Beverly Steven MD

## 2020-03-01 ENCOUNTER — ANESTHESIA EVENT (OUTPATIENT)
Dept: SURGERY | Age: 55
DRG: 316 | End: 2020-03-01
Payer: MEDICAID

## 2020-03-01 ENCOUNTER — ANESTHESIA (OUTPATIENT)
Dept: SURGERY | Age: 55
DRG: 316 | End: 2020-03-01
Payer: MEDICAID

## 2020-03-01 ENCOUNTER — APPOINTMENT (OUTPATIENT)
Dept: MRI IMAGING | Age: 55
DRG: 316 | End: 2020-03-01
Attending: INTERNAL MEDICINE
Payer: MEDICAID

## 2020-03-01 LAB
ANION GAP SERPL CALC-SCNC: 4 MMOL/L (ref 5–15)
BACTERIA SPEC CULT: NORMAL
BACTERIA SPEC CULT: NORMAL
BASOPHILS # BLD: 0 K/UL (ref 0–0.1)
BASOPHILS NFR BLD: 0 % (ref 0–1)
BUN SERPL-MCNC: 12 MG/DL (ref 6–20)
BUN/CREAT SERPL: 13 (ref 12–20)
CALCIUM SERPL-MCNC: 8.5 MG/DL (ref 8.5–10.1)
CHLORIDE SERPL-SCNC: 107 MMOL/L (ref 97–108)
CO2 SERPL-SCNC: 28 MMOL/L (ref 21–32)
CREAT SERPL-MCNC: 0.94 MG/DL (ref 0.7–1.3)
DIFFERENTIAL METHOD BLD: ABNORMAL
EOSINOPHIL # BLD: 0.2 K/UL (ref 0–0.4)
EOSINOPHIL NFR BLD: 2 % (ref 0–7)
ERYTHROCYTE [DISTWIDTH] IN BLOOD BY AUTOMATED COUNT: 14.7 % (ref 11.5–14.5)
GLUCOSE BLD STRIP.AUTO-MCNC: 96 MG/DL (ref 65–100)
GLUCOSE SERPL-MCNC: 102 MG/DL (ref 65–100)
HCT VFR BLD AUTO: 39.7 % (ref 36.6–50.3)
HGB BLD-MCNC: 12.9 G/DL (ref 12.1–17)
IMM GRANULOCYTES # BLD AUTO: 0.1 K/UL (ref 0–0.04)
IMM GRANULOCYTES NFR BLD AUTO: 1 % (ref 0–0.5)
LYMPHOCYTES # BLD: 1.2 K/UL (ref 0.8–3.5)
LYMPHOCYTES NFR BLD: 18 % (ref 12–49)
MAGNESIUM SERPL-MCNC: 2.4 MG/DL (ref 1.6–2.4)
MCH RBC QN AUTO: 32.3 PG (ref 26–34)
MCHC RBC AUTO-ENTMCNC: 32.5 G/DL (ref 30–36.5)
MCV RBC AUTO: 99.3 FL (ref 80–99)
MONOCYTES # BLD: 0.4 K/UL (ref 0–1)
MONOCYTES NFR BLD: 6 % (ref 5–13)
NEUTS SEG # BLD: 5.2 K/UL (ref 1.8–8)
NEUTS SEG NFR BLD: 73 % (ref 32–75)
NRBC # BLD: 0 K/UL (ref 0–0.01)
NRBC BLD-RTO: 0 PER 100 WBC
PHOSPHATE SERPL-MCNC: 2.8 MG/DL (ref 2.6–4.7)
PLATELET # BLD AUTO: 141 K/UL (ref 150–400)
PMV BLD AUTO: 8.6 FL (ref 8.9–12.9)
POTASSIUM SERPL-SCNC: 4 MMOL/L (ref 3.5–5.1)
RBC # BLD AUTO: 4 M/UL (ref 4.1–5.7)
SERVICE CMNT-IMP: NORMAL
SERVICE CMNT-IMP: NORMAL
SODIUM SERPL-SCNC: 139 MMOL/L (ref 136–145)
WBC # BLD AUTO: 7.1 K/UL (ref 4.1–11.1)

## 2020-03-01 PROCEDURE — 74011000250 HC RX REV CODE- 250: Performed by: ORTHOPAEDIC SURGERY

## 2020-03-01 PROCEDURE — 73220 MRI UPPR EXTREMITY W/O&W/DYE: CPT

## 2020-03-01 PROCEDURE — 74011250637 HC RX REV CODE- 250/637: Performed by: FAMILY MEDICINE

## 2020-03-01 PROCEDURE — 36415 COLL VENOUS BLD VENIPUNCTURE: CPT

## 2020-03-01 PROCEDURE — 74011250636 HC RX REV CODE- 250/636: Performed by: NURSE ANESTHETIST, CERTIFIED REGISTERED

## 2020-03-01 PROCEDURE — 77030002916 HC SUT ETHLN J&J -A: Performed by: ORTHOPAEDIC SURGERY

## 2020-03-01 PROCEDURE — 76060000032 HC ANESTHESIA 0.5 TO 1 HR: Performed by: ORTHOPAEDIC SURGERY

## 2020-03-01 PROCEDURE — 74011000258 HC RX REV CODE- 258: Performed by: FAMILY MEDICINE

## 2020-03-01 PROCEDURE — 74011250637 HC RX REV CODE- 250/637: Performed by: ORTHOPAEDIC SURGERY

## 2020-03-01 PROCEDURE — 65270000029 HC RM PRIVATE

## 2020-03-01 PROCEDURE — 83735 ASSAY OF MAGNESIUM: CPT

## 2020-03-01 PROCEDURE — 80048 BASIC METABOLIC PNL TOTAL CA: CPT

## 2020-03-01 PROCEDURE — 87205 SMEAR GRAM STAIN: CPT

## 2020-03-01 PROCEDURE — 0LB70ZZ EXCISION OF RIGHT HAND TENDON, OPEN APPROACH: ICD-10-PCS | Performed by: ORTHOPAEDIC SURGERY

## 2020-03-01 PROCEDURE — A9575 INJ GADOTERATE MEGLUMI 0.1ML: HCPCS | Performed by: INTERNAL MEDICINE

## 2020-03-01 PROCEDURE — 82962 GLUCOSE BLOOD TEST: CPT

## 2020-03-01 PROCEDURE — 74011250636 HC RX REV CODE- 250/636: Performed by: INTERNAL MEDICINE

## 2020-03-01 PROCEDURE — 77030002933 HC SUT MCRYL J&J -A: Performed by: ORTHOPAEDIC SURGERY

## 2020-03-01 PROCEDURE — 77030011640 HC PAD GRND REM COVD -A: Performed by: ORTHOPAEDIC SURGERY

## 2020-03-01 PROCEDURE — 80202 ASSAY OF VANCOMYCIN: CPT

## 2020-03-01 PROCEDURE — 84100 ASSAY OF PHOSPHORUS: CPT

## 2020-03-01 PROCEDURE — 77030031139 HC SUT VCRL2 J&J -A: Performed by: ORTHOPAEDIC SURGERY

## 2020-03-01 PROCEDURE — 76210000017 HC OR PH I REC 1.5 TO 2 HR: Performed by: ORTHOPAEDIC SURGERY

## 2020-03-01 PROCEDURE — 74011000258 HC RX REV CODE- 258: Performed by: ORTHOPAEDIC SURGERY

## 2020-03-01 PROCEDURE — 77030018836 HC SOL IRR NACL ICUM -A: Performed by: ORTHOPAEDIC SURGERY

## 2020-03-01 PROCEDURE — 74011250636 HC RX REV CODE- 250/636: Performed by: FAMILY MEDICINE

## 2020-03-01 PROCEDURE — 77030021122 HC SPLNT MAT FST BSNM -A: Performed by: ORTHOPAEDIC SURGERY

## 2020-03-01 PROCEDURE — 87075 CULTR BACTERIA EXCEPT BLOOD: CPT

## 2020-03-01 PROCEDURE — 87147 CULTURE TYPE IMMUNOLOGIC: CPT

## 2020-03-01 PROCEDURE — 76010000138 HC OR TIME 0.5 TO 1 HR: Performed by: ORTHOPAEDIC SURGERY

## 2020-03-01 PROCEDURE — 77030039497 HC CST PAD STERILE CHCS -A: Performed by: ORTHOPAEDIC SURGERY

## 2020-03-01 PROCEDURE — 77030034890 HC IMMOB HND ALUMI POS DISP KEYS -B: Performed by: ORTHOPAEDIC SURGERY

## 2020-03-01 PROCEDURE — 77030010509 HC AIRWY LMA MSK TELE -A: Performed by: NURSE ANESTHETIST, CERTIFIED REGISTERED

## 2020-03-01 PROCEDURE — 94760 N-INVAS EAR/PLS OXIMETRY 1: CPT

## 2020-03-01 PROCEDURE — 85025 COMPLETE CBC W/AUTO DIFF WBC: CPT

## 2020-03-01 PROCEDURE — 74011000250 HC RX REV CODE- 250: Performed by: NURSE ANESTHETIST, CERTIFIED REGISTERED

## 2020-03-01 PROCEDURE — 74011250636 HC RX REV CODE- 250/636: Performed by: ORTHOPAEDIC SURGERY

## 2020-03-01 PROCEDURE — 77030000032 HC CUF TRNQT ZIMM -B: Performed by: ORTHOPAEDIC SURGERY

## 2020-03-01 RX ORDER — BUPIVACAINE HYDROCHLORIDE 5 MG/ML
INJECTION, SOLUTION EPIDURAL; INTRACAUDAL AS NEEDED
Status: DISCONTINUED | OUTPATIENT
Start: 2020-03-01 | End: 2020-03-01 | Stop reason: HOSPADM

## 2020-03-01 RX ORDER — ACETAMINOPHEN 325 MG/1
650 TABLET ORAL ONCE
Status: DISCONTINUED | OUTPATIENT
Start: 2020-03-01 | End: 2020-03-01 | Stop reason: HOSPADM

## 2020-03-01 RX ORDER — FENTANYL CITRATE 50 UG/ML
50 INJECTION, SOLUTION INTRAMUSCULAR; INTRAVENOUS AS NEEDED
Status: DISCONTINUED | OUTPATIENT
Start: 2020-03-01 | End: 2020-03-01 | Stop reason: HOSPADM

## 2020-03-01 RX ORDER — GLYCOPYRROLATE 0.2 MG/ML
INJECTION INTRAMUSCULAR; INTRAVENOUS AS NEEDED
Status: DISCONTINUED | OUTPATIENT
Start: 2020-03-01 | End: 2020-03-01 | Stop reason: HOSPADM

## 2020-03-01 RX ORDER — SODIUM CHLORIDE 0.9 % (FLUSH) 0.9 %
5-40 SYRINGE (ML) INJECTION AS NEEDED
Status: DISCONTINUED | OUTPATIENT
Start: 2020-03-01 | End: 2020-03-01 | Stop reason: HOSPADM

## 2020-03-01 RX ORDER — GADOTERATE MEGLUMINE 376.9 MG/ML
15 INJECTION INTRAVENOUS
Status: COMPLETED | OUTPATIENT
Start: 2020-03-01 | End: 2020-03-01

## 2020-03-01 RX ORDER — HYDROMORPHONE HYDROCHLORIDE 1 MG/ML
0.2 INJECTION, SOLUTION INTRAMUSCULAR; INTRAVENOUS; SUBCUTANEOUS
Status: DISCONTINUED | OUTPATIENT
Start: 2020-03-01 | End: 2020-03-01 | Stop reason: HOSPADM

## 2020-03-01 RX ORDER — LIDOCAINE HYDROCHLORIDE 20 MG/ML
INJECTION, SOLUTION EPIDURAL; INFILTRATION; INTRACAUDAL; PERINEURAL AS NEEDED
Status: DISCONTINUED | OUTPATIENT
Start: 2020-03-01 | End: 2020-03-01 | Stop reason: HOSPADM

## 2020-03-01 RX ORDER — LIDOCAINE HYDROCHLORIDE 10 MG/ML
0.1 INJECTION, SOLUTION EPIDURAL; INFILTRATION; INTRACAUDAL; PERINEURAL AS NEEDED
Status: DISCONTINUED | OUTPATIENT
Start: 2020-03-01 | End: 2020-03-01 | Stop reason: HOSPADM

## 2020-03-01 RX ORDER — FENTANYL CITRATE 50 UG/ML
25 INJECTION, SOLUTION INTRAMUSCULAR; INTRAVENOUS
Status: DISCONTINUED | OUTPATIENT
Start: 2020-03-01 | End: 2020-03-01 | Stop reason: HOSPADM

## 2020-03-01 RX ORDER — SODIUM CHLORIDE, SODIUM LACTATE, POTASSIUM CHLORIDE, CALCIUM CHLORIDE 600; 310; 30; 20 MG/100ML; MG/100ML; MG/100ML; MG/100ML
50 INJECTION, SOLUTION INTRAVENOUS CONTINUOUS
Status: DISCONTINUED | OUTPATIENT
Start: 2020-03-01 | End: 2020-03-01 | Stop reason: HOSPADM

## 2020-03-01 RX ORDER — ONDANSETRON 2 MG/ML
4 INJECTION INTRAMUSCULAR; INTRAVENOUS AS NEEDED
Status: DISCONTINUED | OUTPATIENT
Start: 2020-03-01 | End: 2020-03-01 | Stop reason: HOSPADM

## 2020-03-01 RX ORDER — FENTANYL CITRATE 50 UG/ML
INJECTION, SOLUTION INTRAMUSCULAR; INTRAVENOUS AS NEEDED
Status: DISCONTINUED | OUTPATIENT
Start: 2020-03-01 | End: 2020-03-01 | Stop reason: HOSPADM

## 2020-03-01 RX ORDER — EPHEDRINE SULFATE/0.9% NACL/PF 50 MG/5 ML
SYRINGE (ML) INTRAVENOUS AS NEEDED
Status: DISCONTINUED | OUTPATIENT
Start: 2020-03-01 | End: 2020-03-01 | Stop reason: HOSPADM

## 2020-03-01 RX ORDER — PHENYLEPHRINE HCL IN 0.9% NACL 0.4MG/10ML
SYRINGE (ML) INTRAVENOUS AS NEEDED
Status: DISCONTINUED | OUTPATIENT
Start: 2020-03-01 | End: 2020-03-01 | Stop reason: HOSPADM

## 2020-03-01 RX ORDER — SODIUM CHLORIDE 0.9 % (FLUSH) 0.9 %
10 SYRINGE (ML) INJECTION
Status: COMPLETED | OUTPATIENT
Start: 2020-03-01 | End: 2020-03-01

## 2020-03-01 RX ORDER — MIDAZOLAM HYDROCHLORIDE 1 MG/ML
1 INJECTION, SOLUTION INTRAMUSCULAR; INTRAVENOUS AS NEEDED
Status: DISCONTINUED | OUTPATIENT
Start: 2020-03-01 | End: 2020-03-01 | Stop reason: HOSPADM

## 2020-03-01 RX ORDER — MIDAZOLAM HYDROCHLORIDE 1 MG/ML
INJECTION, SOLUTION INTRAMUSCULAR; INTRAVENOUS AS NEEDED
Status: DISCONTINUED | OUTPATIENT
Start: 2020-03-01 | End: 2020-03-01 | Stop reason: HOSPADM

## 2020-03-01 RX ORDER — SODIUM CHLORIDE, SODIUM LACTATE, POTASSIUM CHLORIDE, CALCIUM CHLORIDE 600; 310; 30; 20 MG/100ML; MG/100ML; MG/100ML; MG/100ML
INJECTION, SOLUTION INTRAVENOUS
Status: DISCONTINUED | OUTPATIENT
Start: 2020-03-01 | End: 2020-03-01 | Stop reason: HOSPADM

## 2020-03-01 RX ORDER — SODIUM CHLORIDE 0.9 % (FLUSH) 0.9 %
5-40 SYRINGE (ML) INJECTION EVERY 8 HOURS
Status: DISCONTINUED | OUTPATIENT
Start: 2020-03-01 | End: 2020-03-01 | Stop reason: HOSPADM

## 2020-03-01 RX ORDER — ONDANSETRON 2 MG/ML
INJECTION INTRAMUSCULAR; INTRAVENOUS AS NEEDED
Status: DISCONTINUED | OUTPATIENT
Start: 2020-03-01 | End: 2020-03-01 | Stop reason: HOSPADM

## 2020-03-01 RX ORDER — PROPOFOL 10 MG/ML
INJECTION, EMULSION INTRAVENOUS AS NEEDED
Status: DISCONTINUED | OUTPATIENT
Start: 2020-03-01 | End: 2020-03-01 | Stop reason: HOSPADM

## 2020-03-01 RX ADMIN — ACETAMINOPHEN 650 MG: 325 TABLET ORAL at 13:43

## 2020-03-01 RX ADMIN — Medication 10 MG: at 17:43

## 2020-03-01 RX ADMIN — CEFEPIME HYDROCHLORIDE 2 G: 2 INJECTION, POWDER, FOR SOLUTION INTRAVENOUS at 22:31

## 2020-03-01 RX ADMIN — EMTRICITABINE AND TENOFOVIR DISOPROXIL FUMARATE 1 TABLET: 200; 300 TABLET, FILM COATED ORAL at 21:48

## 2020-03-01 RX ADMIN — SERTRALINE HYDROCHLORIDE 200 MG: 50 TABLET ORAL at 09:53

## 2020-03-01 RX ADMIN — Medication 120 MCG: at 17:34

## 2020-03-01 RX ADMIN — PROPOFOL 200 MG: 10 INJECTION, EMULSION INTRAVENOUS at 17:29

## 2020-03-01 RX ADMIN — HEPARIN SODIUM 5000 UNITS: 5000 INJECTION INTRAVENOUS; SUBCUTANEOUS at 22:31

## 2020-03-01 RX ADMIN — VANCOMYCIN HYDROCHLORIDE 1000 MG: 1 INJECTION, POWDER, LYOPHILIZED, FOR SOLUTION INTRAVENOUS at 23:29

## 2020-03-01 RX ADMIN — VANCOMYCIN HYDROCHLORIDE 1000 MG: 1 INJECTION, POWDER, LYOPHILIZED, FOR SOLUTION INTRAVENOUS at 13:48

## 2020-03-01 RX ADMIN — CEFEPIME HYDROCHLORIDE 2 G: 2 INJECTION, POWDER, FOR SOLUTION INTRAVENOUS at 11:24

## 2020-03-01 RX ADMIN — LIDOCAINE HYDROCHLORIDE 100 MG: 20 INJECTION, SOLUTION EPIDURAL; INFILTRATION; INTRACAUDAL; PERINEURAL at 17:29

## 2020-03-01 RX ADMIN — METRONIDAZOLE 500 MG: 500 INJECTION, SOLUTION INTRAVENOUS at 13:47

## 2020-03-01 RX ADMIN — LORAZEPAM 0.5 MG: 0.5 TABLET ORAL at 09:53

## 2020-03-01 RX ADMIN — GLYCOPYRROLATE 0.2 MG: 0.2 INJECTION, SOLUTION INTRAMUSCULAR; INTRAVENOUS at 17:42

## 2020-03-01 RX ADMIN — MORPHINE SULFATE 1 MG: 2 INJECTION, SOLUTION INTRAMUSCULAR; INTRAVENOUS at 21:18

## 2020-03-01 RX ADMIN — SODIUM CHLORIDE, POTASSIUM CHLORIDE, SODIUM LACTATE AND CALCIUM CHLORIDE: 600; 310; 30; 20 INJECTION, SOLUTION INTRAVENOUS at 17:20

## 2020-03-01 RX ADMIN — MORPHINE SULFATE 1 MG: 2 INJECTION, SOLUTION INTRAMUSCULAR; INTRAVENOUS at 09:54

## 2020-03-01 RX ADMIN — HEPARIN SODIUM 5000 UNITS: 5000 INJECTION INTRAVENOUS; SUBCUTANEOUS at 07:29

## 2020-03-01 RX ADMIN — DOLUTEGRAVIR SODIUM 50 MG: 50 TABLET, FILM COATED ORAL at 21:48

## 2020-03-01 RX ADMIN — MORPHINE SULFATE 1 MG: 2 INJECTION, SOLUTION INTRAMUSCULAR; INTRAVENOUS at 05:29

## 2020-03-01 RX ADMIN — ACETAMINOPHEN 650 MG: 325 TABLET ORAL at 09:52

## 2020-03-01 RX ADMIN — Medication 40 MCG: at 17:32

## 2020-03-01 RX ADMIN — ZOLPIDEM TARTRATE 10 MG: 5 TABLET ORAL at 21:48

## 2020-03-01 RX ADMIN — MORPHINE SULFATE 1 MG: 2 INJECTION, SOLUTION INTRAMUSCULAR; INTRAVENOUS at 13:43

## 2020-03-01 RX ADMIN — GLYCOPYRROLATE 0.2 MG: 0.2 INJECTION, SOLUTION INTRAMUSCULAR; INTRAVENOUS at 17:41

## 2020-03-01 RX ADMIN — GADOTERATE MEGLUMINE 15 ML: 376.9 INJECTION INTRAVENOUS at 10:25

## 2020-03-01 RX ADMIN — SODIUM CHLORIDE 75 ML/HR: 900 INJECTION, SOLUTION INTRAVENOUS at 19:00

## 2020-03-01 RX ADMIN — MIDAZOLAM 2 MG: 1 INJECTION INTRAMUSCULAR; INTRAVENOUS at 17:26

## 2020-03-01 RX ADMIN — FENTANYL CITRATE 100 MCG: 50 INJECTION, SOLUTION INTRAMUSCULAR; INTRAVENOUS at 17:29

## 2020-03-01 RX ADMIN — METRONIDAZOLE 500 MG: 500 INJECTION, SOLUTION INTRAVENOUS at 01:23

## 2020-03-01 RX ADMIN — ONDANSETRON HYDROCHLORIDE 4 MG: 2 INJECTION, SOLUTION INTRAMUSCULAR; INTRAVENOUS at 17:46

## 2020-03-01 RX ADMIN — Medication 10 ML: at 10:26

## 2020-03-01 NOTE — ANESTHESIA PREPROCEDURE EVALUATION
Relevant Problems   No relevant active problems       Anesthetic History   No history of anesthetic complications            Review of Systems / Medical History  Patient summary reviewed, nursing notes reviewed and pertinent labs reviewed    Pulmonary  Within defined limits                 Neuro/Psych         Psychiatric history     Cardiovascular  Within defined limits                Exercise tolerance: >4 METS     GI/Hepatic/Renal       Hepatitis: type C    Liver disease     Endo/Other  Within defined limits           Other Findings   Comments: HIV           Physical Exam    Airway  Mallampati: II  TM Distance: 4 - 6 cm  Neck ROM: normal range of motion   Mouth opening: Normal     Cardiovascular    Rhythm: regular  Rate: normal         Dental  No notable dental hx       Pulmonary  Breath sounds clear to auscultation               Abdominal  Abdominal exam normal       Other Findings            Anesthetic Plan    ASA: 2  Anesthesia type: general          Induction: Intravenous  Anesthetic plan and risks discussed with: Patient

## 2020-03-01 NOTE — ANESTHESIA POSTPROCEDURE EVALUATION
Post-Anesthesia Evaluation and Assessment    Patient: Katt Yadav MRN: [id number]  SSN: [id number]    YOB: 1965  Age: 53 y.o. Sex: male      I have evaluated the patient and they are stable and ready for discharge from the PACU. Cardiovascular Function/Vital Signs  Visit Vitals  /63   Pulse (!) 115   Temp 36.6 °C (97.8 °F)   Resp 13   Wt 77.8 kg (171 lb 8.3 oz)   SpO2 99%   BMI 24.61 kg/m²       Patient is status post General anesthesia for Procedure(s):  INCISION AND DRAINAGE RIGHT HAND. Nausea/Vomiting: None    Postoperative hydration reviewed and adequate. Pain:  Pain Scale 1: Numeric (0 - 10) (03/01/20 1808)  Pain Intensity 1: 0 (03/01/20 1808)   Managed    Neurological Status:   Neuro (WDL): Exceptions to WDL (03/01/20 1759)  Neuro  Neurologic State: Sleeping(s/p anesthesia) (03/01/20 1759)   At baseline    Mental Status, Level of Consciousness: Alert and  oriented to person, place, and time    Pulmonary Status:   O2 Device: Nasal cannula (03/01/20 1808)   Adequate oxygenation and airway patent    Complications related to anesthesia: None    Post-anesthesia assessment completed.  No concerns    Signed By: Maryjane Morse MD     March 1, 2020

## 2020-03-01 NOTE — PROGRESS NOTES
Received call from Dr. Nolvia Carver, wondering why patient's MRI was not done yesterday. Called MRI to ask why and was told that the MRI screening was not completed yesterday. Informed Dr. Nolvia Carver of this, then completed MRI screening form.

## 2020-03-01 NOTE — PROGRESS NOTES
Bedside shift change report given to Aakash Webb (oncoming nurse) by Bernard Stanford (offgoing nurse). Report included the following information SBAR, Kardex, Intake/Output, MAR and Recent Results.

## 2020-03-01 NOTE — PROGRESS NOTES
[facility]  Hospitalist Group                                                                                          Hospitalist Progress Note  Skye Smith MD  Answering service: [phone] OR [phone] from in house phone        Date of Service:  3/1/2020  NAME:  Sonam Noyola  :  1965  MRN:  [id number]      Admission Summary:   The patient is a 79-year-old male with past medical history of HIV and hepatitis, who presents to the hospital with the above-mentioned symptom. The patient came in with the right hand redness and swelling. The patient reports that he was chewing his nails at the side of the index finger and had somewhat skin break post that, this he had 2 days back. Does not have any recollection of any injury or trauma to the hand besides this. Reports that he started experiencing swelling, redness, and tenderness of his right hand. The hand was tight and he was not able to make a fist.  He reports that he does not have any insect bites or injuries that he can recollect. Interval history / Subjective:   Still waiting for MRI result. Erythema has receded, but there is still significant swelling of the index finger. Pain is well controlled. Still having trouble moving his finger 2/2 pain. Assessment & Plan:     Cellulitis of the right hand -suspect there is also tenosynovitis  -MRI of the right hand is ordered to rule out abscess, I have called and expedited with MRI  -Erythema is receding.  -Orthopedics consulted, appreciate recommendations, NPO currently for potential washout  -ID following  -Continue IV vancomycin, IV cefepime, IV Flagyl was added to morning    HIV -unknown last CD4 count.   Per patient is somewhere between 200-300  -Continue Truvada, Tivicay  -Infectious disease following    Hepatitis C- unknown if this is been treated before  -Infectious disease already following, will check with their office to see if any RNA or viral loads have been done recently before ordering    Depression- continue SSRI  ? ADHD- continue Adderall    Code status: Full  DVT prophylaxis: Lovenox (tomorrow)    Care Plan discussed with: Patient/Family  Anticipated Disposition: Home w/Family  Anticipated Discharge: Greater than 48 hours     Hospital Problems  Date Reviewed: 3/12/2012          Codes Class Noted POA    Cellulitis ICD-10-CM: L03.90  ICD-9-CM: 682.9  2/28/2020 Unknown                Review of Systems:   A comprehensive review of systems was negative except for that written in the HPI. Vital Signs:    Last 24hrs VS reviewed since prior progress note. Most recent are:  Visit Vitals  /89 (BP 1 Location: Left arm, BP Patient Position: At rest)   Pulse 85   Temp 98.2 °F (36.8 °C)   Resp 18   Wt 77.8 kg (171 lb 8.3 oz)   SpO2 92%   BMI 24.61 kg/m²         Intake/Output Summary (Last 24 hours) at 3/1/2020 1405  Last data filed at 3/1/2020 0615  Gross per 24 hour   Intake    Output 1150 ml   Net -1150 ml        Physical Examination:             Constitutional:  No acute distress, cooperative, pleasant. ENT:  Oral mucosa moist, oropharynx benign. Resp:  CTA bilaterally. No wheezing/rhonchi/rales. No accessory muscle use   CV:  Regular rhythm, normal rate, no murmurs, gallops, rubs    GI:  Soft, non distended, non tender. normoactive bowel sounds, no hepatosplenomegaly     Musculoskeletal:  R hand with significant swelling, erythema, worst over the 2nd, 3rd digits. No obvious abscess noted. Neurologic:  Moves all extremities. AAOx3, CN II-XII reviewed     Psych:  Good insight, Not anxious nor agitated.        Data Review:    Review and/or order of clinical lab test  Review and/or order of tests in the radiology section of CPT  Review and/or order of tests in the medicine section of CPT      Labs:     Recent Labs     03/01/20  0532 02/29/20  0353   WBC 7.1 12.0*   HGB 12.9 13.1   HCT 39.7 40.4   * 132*     Recent Labs     03/01/20  0532 02/29/20  0353 02/28/20  1630    137 135*   K 4.0 4.1 4.1    106 104   CO2 28 25 31   BUN 12 13 13   CREA 0.94 1.06 1.17   * 129* 97   CA 8.5 8.7 8.7   MG 2.4  --   --    PHOS 2.8  --   --      Recent Labs     02/28/20  1630   SGOT 8*   ALT 20      TBILI 1.3*   TP 7.7   ALB 4.0   GLOB 3.7     No results for input(s): INR, PTP, APTT, INREXT, INREXT in the last 72 hours. No results for input(s): FE, TIBC, PSAT, FERR in the last 72 hours. No results found for: FOL, RBCF   No results for input(s): PH, PCO2, PO2 in the last 72 hours. No results for input(s): CPK, CKNDX, TROIQ in the last 72 hours.     No lab exists for component: CPKMB  No results found for: CHOL, CHOLX, CHLST, CHOLV, HDL, HDLP, LDL, LDLC, DLDLP, TGLX, TRIGL, TRIGP, CHHD, CHHDX  Lab Results   Component Value Date/Time    Glucose (POC) 96 03/01/2020 06:58 AM    Glucose (POC) 129 (H) 02/29/2020 09:39 PM     No results found for: COLOR, APPRN, SPGRU, REFSG, JESUS, PROTU, GLUCU, KETU, BILU, UROU, STEPHANIE, LEUKU, GLUKE, EPSU, BACTU, WBCU, RBCU, CASTS, UCRY      Medications Reviewed:     Current Facility-Administered Medications   Medication Dose Route Frequency    [START ON 3/2/2020] Vancomycin level due @ 00:00 on 3/2/20   Other ONCE    metroNIDAZOLE (FLAGYL) IVPB premix 500 mg  500 mg IntraVENous Q12H    dolutegravir (TIVICAY) tablet 50 mg  50 mg Oral QHS    emtricitabine-tenofovir (TDF) (TRUVADA) 200-300 mg per tablet 1 Tab  1 Tab Oral QHS    dextroamphetamine-amphetamine (ADDERALL) tablet 20 mg  20 mg Oral DAILY    LORazepam (ATIVAN) tablet 0.5 mg  0.5 mg Oral BID    sertraline (ZOLOFT) tablet 200 mg  200 mg Oral DAILY    zolpidem (AMBIEN) tablet 10 mg  10 mg Oral QHS    sodium chloride (NS) flush 5-40 mL  5-40 mL IntraVENous Q8H    sodium chloride (NS) flush 5-40 mL  5-40 mL IntraVENous PRN    0.9% sodium chloride infusion  75 mL/hr IntraVENous CONTINUOUS    acetaminophen (TYLENOL) tablet 650 mg  650 mg Oral Q4H PRN  heparin (porcine) injection 5,000 Units  5,000 Units SubCUTAneous Q8H    cefepime (MAXIPIME) 2 g in 0.9% sodium chloride (MBP/ADV) 100 mL  2 g IntraVENous Q12H    morphine injection 1 mg  1 mg IntraVENous Q4H PRN    Vancomycin- pharmacy to dose   Other Rx Dosing/Monitoring    vancomycin (VANCOCIN) 1,000 mg in 0.9% sodium chloride (MBP/ADV) 250 mL  1,000 mg IntraVENous Q12H     ______________________________________________________________________  EXPECTED LENGTH OF STAY: - - -  ACTUAL LENGTH OF STAY:          2                 Elias Avalos MD

## 2020-03-01 NOTE — PHYSICIAN ADVISORY
Letter of Status Determination:  
Recommend hospitalization status upgraded from INPATIENT  to INPATIENT  Status Pt Name:  Choco Osborne MR#  
LISA # [id number] / 
[id number] Payor: [employer] / Plan: [address] [place] [address] / Product Type: Managed Care Medicaid /   
JOANA#  [id number] Room and Hospital  570/01  @ . [facility] hospital  
Hospitalization date  2/28/2020  6:12 PM  
Current Attending Physician  Jean Latham I* Principal diagnosis  Cellulitis [F15.91] Clinicals  53 y.o. y.o  male hospitalized with above diagnosis Cellulitis of the right hand -suspect there is also tenosynovitis 
-MRI of the right hand is ordered to rule out abscess 
-Will go ahead and marked the area of erythema today 
-Orthopedics consulted, appreciate recommendations -Infectious disease consultation will be requested 
-Continue IV vancomycin, IV cefepime, IV Flagyl was added to morning 
-Check UDS 
  
HIV -unknown last CD4 count. Per patient is somewhere between 200-300 [place] (MCG) criteria Does  NOT apply STATUS DETERMINATION   
  KEEP IP Additional comments Payor: [employer] / Plan: [address] [place] [address] / Product Type: Managed Care Medicaid /   
  
 
 
Alyssa Ortiz [address] [facility] T: [id number]    [email or web address]

## 2020-03-01 NOTE — PROGRESS NOTES
ID Progress Note  3/1/2020    Subjective:     Afebrile. The redness has receded. The right second finger though is still swollen. No dyspnea, headache, sore throat or abdominal pain. MRI done today shows tenosynovitis of the right second finger. Review of systems: No anaphylaxis, seizures, hematemesis, hematochezia    Objective:     Vitals:   Visit Vitals  /89 (BP 1 Location: Left arm, BP Patient Position: At rest)   Pulse 85   Temp 98.2 °F (36.8 °C)   Resp 18   Wt 77.8 kg (171 lb 8.3 oz)   SpO2 92%   BMI 24.61 kg/m²        Tmax:  Temp (24hrs), Av.2 °F (36.8 °C), Min:97.8 °F (36.6 °C), Max:98.4 °F (36.9 °C)      Exam:    Not in distress  Eyes: pink conjunctivae, anicteric sclerae  Lungs: clear to auscultation, no rales, wheezes or rhonchi  Heart: s1, s2, no murmurs rubs or clicks,   Abdomen: soft, nontender, no guarding or rebound  Extremities:  the redness has receded from the right hand. The  right index finger though is still swollen and  to palpation. Speech fluent  No cervical lymphadenopathy    Labs:   Lab Results   Component Value Date/Time    WBC 7.1 2020 05:32 AM    HGB 12.9 2020 05:32 AM    HCT 39.7 2020 05:32 AM    PLATELET 713 (L)  05:32 AM    MCV 99.3 (H) 2020 05:32 AM     Lab Results   Component Value Date/Time    Sodium 139 2020 05:32 AM    Potassium 4.0 2020 05:32 AM    Chloride 107 2020 05:32 AM    CO2 28 2020 05:32 AM    Anion gap 4 (L) 2020 05:32 AM    Glucose 102 (H) 2020 05:32 AM    BUN 12 2020 05:32 AM    Creatinine 0.94 2020 05:32 AM    BUN/Creatinine ratio 13 2020 05:32 AM    GFR est AA >60 2020 05:32 AM    GFR est non-AA >60 2020 05:32 AM    Calcium 8.5 2020 05:32 AM    Bilirubin, total 1.3 (H) 2020 04:30 PM    AST (SGOT) 8 (L) 2020 04:30 PM    Alk.  phosphatase 101 2020 04:30 PM    Protein, total 7.7 2020 04:30 PM    Albumin 4.0 02/28/2020 04:30 PM    Globulin 3.7 02/28/2020 04:30 PM    A-G Ratio 1.1 02/28/2020 04:30 PM    ALT (SGPT) 20 02/28/2020 04:30 PM           Assessment:     Right hand cellulitis      HIV     Hepatitis C              Recommendations:     He has right second flexor Tenosynovitis. The redness has improved. I would continue vancomycin, cefepime and Flagyl at this time. Orthopedics will be seeing him today to decide whether he will need surgery.      Continue Truvada and Tivicay for his HIV. He tells me that his blood work was done about 4 weeks ago. This can be checked at our office once it opens on Monday.     Yumi García MD

## 2020-03-01 NOTE — BRIEF OP NOTE
BRIEF OPERATIVE NOTE    Date of Procedure: 3/1/2020   Preoperative Diagnosis: flexor tenosynovitis  Postoperative Diagnosis: flexor tenosynovitis    Procedure(s):  INCISION AND DRAINAGE RIGHT HAND  Surgeon(s) and Role:     * Hernesto Orellana MD - Primary         Surgical Assistant: none    Surgical Staff:  Circ-1: Narda Castro RN  Circ-2: Last Brown RN  Scrub RN-1: Sean GILLESPIE  Event Time In Time Out   Incision Start 03/01/2020 1737    Incision Close 03/01/2020 1750      Anesthesia: General   Estimated Blood Loss: min  Specimens:   ID Type Source Tests Collected by Time Destination   1 : right index finger Wound Finger CULTURE, ANAEROBIC, CULTURE, WOUND W Hiral Sahni MD 3/1/2020 1744 Microbiology      Findings: pus in sheath   Complications: none  Implants: * No implants in log *

## 2020-03-01 NOTE — PROGRESS NOTES
Ortho Daily Progress Note      Patient: Colton Scott                   MRN: [id number]  Sex: male  YOB: 1965           Age: 53 y.o. Subjective:  Right hand cellulitis, unable to use index, pain about the same, MRI this am    Objective[de-identified]  Right hand swelling slightly improved dorsally, Index finger remains quite swollen/red, he is exquisitely tender throughout index and proximally into the palm, unable to tolerate active or passive ROM     Imaging:  MRI HAND RT W WO CONT 3/1/2020 11:00  ---PRELIMINARY REPORT---  Tendinitis of the second flexor tendon. No abscess  Preliminary report was provided by Dr. Danice Meckel, the on-call radiologist, at 8339  Final report to follow.   ---END PRELIMINARY REPORT---    Plan:  Dr. Efrain Lange to take to OR for I&D this evening  Consent completed  Remain NPO    ISSA Bryan  3/1/2020   1:59 PM      .

## 2020-03-01 NOTE — PROGRESS NOTES
Primary Nurse Mynor Helm RN and Nahomy RN performed a dual skin assessment on this patient No impairment noted  Abundio score is 22

## 2020-03-02 LAB
ANION GAP SERPL CALC-SCNC: 6 MMOL/L (ref 5–15)
BASOPHILS # BLD: 0 K/UL (ref 0–0.1)
BASOPHILS NFR BLD: 0 % (ref 0–1)
BUN SERPL-MCNC: 11 MG/DL (ref 6–20)
BUN/CREAT SERPL: 11 (ref 12–20)
CALCIUM SERPL-MCNC: 8.9 MG/DL (ref 8.5–10.1)
CHLORIDE SERPL-SCNC: 103 MMOL/L (ref 97–108)
CO2 SERPL-SCNC: 26 MMOL/L (ref 21–32)
CREAT SERPL-MCNC: 1.02 MG/DL (ref 0.7–1.3)
DATE LAST DOSE: ABNORMAL
DIFFERENTIAL METHOD BLD: ABNORMAL
EOSINOPHIL # BLD: 0.1 K/UL (ref 0–0.4)
EOSINOPHIL NFR BLD: 1 % (ref 0–7)
ERYTHROCYTE [DISTWIDTH] IN BLOOD BY AUTOMATED COUNT: 14.5 % (ref 11.5–14.5)
GLUCOSE SERPL-MCNC: 116 MG/DL (ref 65–100)
HCT VFR BLD AUTO: 39.6 % (ref 36.6–50.3)
HGB BLD-MCNC: 13.2 G/DL (ref 12.1–17)
IMM GRANULOCYTES # BLD AUTO: 0.1 K/UL (ref 0–0.04)
IMM GRANULOCYTES NFR BLD AUTO: 1 % (ref 0–0.5)
LYMPHOCYTES # BLD: 1.4 K/UL (ref 0.8–3.5)
LYMPHOCYTES NFR BLD: 16 % (ref 12–49)
MAGNESIUM SERPL-MCNC: 2.3 MG/DL (ref 1.6–2.4)
MCH RBC QN AUTO: 32.3 PG (ref 26–34)
MCHC RBC AUTO-ENTMCNC: 33.3 G/DL (ref 30–36.5)
MCV RBC AUTO: 96.8 FL (ref 80–99)
MONOCYTES # BLD: 0.5 K/UL (ref 0–1)
MONOCYTES NFR BLD: 5 % (ref 5–13)
NEUTS SEG # BLD: 6.9 K/UL (ref 1.8–8)
NEUTS SEG NFR BLD: 77 % (ref 32–75)
NRBC # BLD: 0 K/UL (ref 0–0.01)
NRBC BLD-RTO: 0 PER 100 WBC
PHOSPHATE SERPL-MCNC: 3.4 MG/DL (ref 2.6–4.7)
PLATELET # BLD AUTO: 197 K/UL (ref 150–400)
PMV BLD AUTO: 8.7 FL (ref 8.9–12.9)
POTASSIUM SERPL-SCNC: 4.1 MMOL/L (ref 3.5–5.1)
RBC # BLD AUTO: 4.09 M/UL (ref 4.1–5.7)
REPORTED DOSE,DOSE: ABNORMAL UNITS
REPORTED DOSE/TIME,TMG: ABNORMAL
SODIUM SERPL-SCNC: 135 MMOL/L (ref 136–145)
VANCOMYCIN TROUGH SERPL-MCNC: 13.7 UG/ML (ref 5–10)
WBC # BLD AUTO: 9 K/UL (ref 4.1–11.1)

## 2020-03-02 PROCEDURE — 83735 ASSAY OF MAGNESIUM: CPT

## 2020-03-02 PROCEDURE — 74011250637 HC RX REV CODE- 250/637: Performed by: ORTHOPAEDIC SURGERY

## 2020-03-02 PROCEDURE — 36415 COLL VENOUS BLD VENIPUNCTURE: CPT

## 2020-03-02 PROCEDURE — 84100 ASSAY OF PHOSPHORUS: CPT

## 2020-03-02 PROCEDURE — 80048 BASIC METABOLIC PNL TOTAL CA: CPT

## 2020-03-02 PROCEDURE — 74011250636 HC RX REV CODE- 250/636: Performed by: INTERNAL MEDICINE

## 2020-03-02 PROCEDURE — 65270000029 HC RM PRIVATE

## 2020-03-02 PROCEDURE — 74011000258 HC RX REV CODE- 258: Performed by: ORTHOPAEDIC SURGERY

## 2020-03-02 PROCEDURE — 74011250636 HC RX REV CODE- 250/636: Performed by: ORTHOPAEDIC SURGERY

## 2020-03-02 PROCEDURE — 74011250637 HC RX REV CODE- 250/637: Performed by: INTERNAL MEDICINE

## 2020-03-02 PROCEDURE — 85025 COMPLETE CBC W/AUTO DIFF WBC: CPT

## 2020-03-02 RX ORDER — MORPHINE SULFATE 2 MG/ML
2 INJECTION, SOLUTION INTRAMUSCULAR; INTRAVENOUS
Status: DISCONTINUED | OUTPATIENT
Start: 2020-03-02 | End: 2020-03-04 | Stop reason: HOSPADM

## 2020-03-02 RX ORDER — OXYCODONE HYDROCHLORIDE 5 MG/1
5 TABLET ORAL
Status: DISCONTINUED | OUTPATIENT
Start: 2020-03-02 | End: 2020-03-04 | Stop reason: HOSPADM

## 2020-03-02 RX ADMIN — MORPHINE SULFATE 1 MG: 2 INJECTION, SOLUTION INTRAMUSCULAR; INTRAVENOUS at 05:55

## 2020-03-02 RX ADMIN — HEPARIN SODIUM 5000 UNITS: 5000 INJECTION INTRAVENOUS; SUBCUTANEOUS at 14:24

## 2020-03-02 RX ADMIN — METRONIDAZOLE 500 MG: 500 INJECTION, SOLUTION INTRAVENOUS at 17:01

## 2020-03-02 RX ADMIN — ZOLPIDEM TARTRATE 10 MG: 5 TABLET ORAL at 22:11

## 2020-03-02 RX ADMIN — HEPARIN SODIUM 5000 UNITS: 5000 INJECTION INTRAVENOUS; SUBCUTANEOUS at 22:11

## 2020-03-02 RX ADMIN — Medication 10 ML: at 14:26

## 2020-03-02 RX ADMIN — OXYCODONE 5 MG: 5 TABLET ORAL at 18:27

## 2020-03-02 RX ADMIN — LORAZEPAM 0.5 MG: 0.5 TABLET ORAL at 10:10

## 2020-03-02 RX ADMIN — METRONIDAZOLE 500 MG: 500 INJECTION, SOLUTION INTRAVENOUS at 01:22

## 2020-03-02 RX ADMIN — Medication 10 ML: at 22:16

## 2020-03-02 RX ADMIN — OXYCODONE 5 MG: 5 TABLET ORAL at 12:24

## 2020-03-02 RX ADMIN — SERTRALINE HYDROCHLORIDE 200 MG: 50 TABLET ORAL at 10:10

## 2020-03-02 RX ADMIN — CEFEPIME HYDROCHLORIDE 2 G: 2 INJECTION, POWDER, FOR SOLUTION INTRAVENOUS at 22:16

## 2020-03-02 RX ADMIN — DOLUTEGRAVIR SODIUM 50 MG: 50 TABLET, FILM COATED ORAL at 22:15

## 2020-03-02 RX ADMIN — EMTRICITABINE AND TENOFOVIR DISOPROXIL FUMARATE 1 TABLET: 200; 300 TABLET, FILM COATED ORAL at 22:15

## 2020-03-02 RX ADMIN — HEPARIN SODIUM 5000 UNITS: 5000 INJECTION INTRAVENOUS; SUBCUTANEOUS at 06:00

## 2020-03-02 RX ADMIN — CEFEPIME HYDROCHLORIDE 2 G: 2 INJECTION, POWDER, FOR SOLUTION INTRAVENOUS at 11:12

## 2020-03-02 RX ADMIN — MORPHINE SULFATE 1 MG: 2 INJECTION, SOLUTION INTRAMUSCULAR; INTRAVENOUS at 10:10

## 2020-03-02 RX ADMIN — MORPHINE SULFATE 1 MG: 2 INJECTION, SOLUTION INTRAMUSCULAR; INTRAVENOUS at 01:21

## 2020-03-02 RX ADMIN — VANCOMYCIN HYDROCHLORIDE 1000 MG: 1 INJECTION, POWDER, LYOPHILIZED, FOR SOLUTION INTRAVENOUS at 14:31

## 2020-03-02 RX ADMIN — LORAZEPAM 0.5 MG: 0.5 TABLET ORAL at 18:27

## 2020-03-02 RX ADMIN — MORPHINE SULFATE 2 MG: 2 INJECTION, SOLUTION INTRAMUSCULAR; INTRAVENOUS at 14:24

## 2020-03-02 RX ADMIN — MORPHINE SULFATE 2 MG: 2 INJECTION, SOLUTION INTRAMUSCULAR; INTRAVENOUS at 21:13

## 2020-03-02 NOTE — PROGRESS NOTES
Pharmacist Note - Vancomycin Dosing  Therapy day 4  Indication:  R hand cellulitis; hx of MRSA, HIV, Hep C  Current regimen: 1000 mg q 12 h    A Trough Level resulted at 13.7 mcg/mL which was obtained 9.5 hrs post-dose. The extrapolated \"true\" trough is approximately 10.9 mcg/mL based on the patient's known kinetics. Goal trough: 10 - 15 mcg/mL     Plan: Continue current regimen. Pharmacy will continue to monitor this patient daily for changes in clinical status and renal function.

## 2020-03-02 NOTE — PROGRESS NOTES
Bedside and Verbal shift change report given to Yareli Gomes (oncoming nurse) by Toby Montenegro RN (offgoing nurse). Report included the following information SBAR, Kardex, OR Summary, Procedure Summary, Intake/Output, MAR, Accordion and Recent Results.      Primary Nurse Ilana Langley and Toby Montenegro RN performed a dual skin assessment on this patient No impairment noted  Abundio score is 21

## 2020-03-02 NOTE — PROGRESS NOTES
POD#1 I&D right index FTS  Pain controlled    Afebrile  Dressing taken down. Hand looks improved. Swelling and erythema improved. BCR index    Cultures NGTD    Plan:   Hand re-splinted  Ice and elevation  Follow cultures - hopefully something will grow - was on iv abx several days prior to I&D  Cont IV abx for now.

## 2020-03-02 NOTE — PROGRESS NOTES
Bedside and Verbal shift change report given to Trixie Riedel, RN (oncoming nurse) by Med Rodriguez RN (offgoing nurse). Report included the following information SBAR, OR Summary, Procedure Summary, Intake/Output, MAR, Accordion and Recent Results.

## 2020-03-02 NOTE — PERIOP NOTES
TRANSFER - OUT REPORT:    Verbal report given to Hossein Vaughn RN on Maximiliano Mcqueen  being transferred to 18 for routine post - op       Report consisted of patients Situation, Background, Assessment and   Recommendations(SBAR). Time Pre op antibiotic given:on schedule  Anesthesia Stop time: 1802  Nolen Present on Transfer to floor:none  Order for Nolen on Chart:none  Discharge Prescriptions with Chart:none    Information from the following report(s) OR Summary, Procedure Summary, Intake/Output, MAR, Accordion, Recent Results, Med Rec Status and Cardiac Rhythm nsr was reviewed with the receiving nurse. Opportunity for questions and clarification was provided. Is the patient on 02? NO           Is the patient on a monitor? NO    Is the nurse transporting with the patient? YES    Surgical Waiting Area notified of patient's transfer from PACU? YES. No family present.       The following personal items collected during your admission accompanied patient upon transfer:   Dental Appliance: Dental Appliances: None  Vision: Visual Aid: Glasses  Hearing Aid:    Jewelry:    Clothing:    Other Valuables:    Valuables sent to safe:

## 2020-03-02 NOTE — OP NOTES
[address]  OPERATIVE REPORT    Name:  Clinton Busch  MR#:  [id number]  :  1965  ACCOUNT #:  [de-identified]  DATE OF SERVICE:  2020      PREOPERATIVE DIAGNOSIS:  Right index suppurative flexor tenosynovitis. POSTOPERATIVE DIAGNOSIS:  Right index suppurative flexor tenosynovitis. PROCEDURE PERFORMED:  Irrigation and debridement of right index finger flexor sheath for suppurative flexor tenosynovitis. SURGEON:  Nikki Bonner MD    ASSISTANT:  None. ANESTHESIA:  General.    COMPLICATIONS:  None. SPECIMENS REMOVED:  Culture, right index finger. IMPLANTS:  None. ESTIMATED BLOOD LOSS:  None. INDICATIONS FOR THE PROCEDURE:  This is a 71-year-old HIV positive gentleman who has developed an infection of the right hand after biting his nail. He denies any puncture wound or penetrating trauma to the extremity. He has developed significant swelling and erythema of the index finger. An MRI obtained earlier today has demonstrated flexor tendinitis. He has failed to respond to IV antibiotics. He is therefore indicated for incision and drainage. I have discussed risks, benefits, potential complications and alternatives of surgery with the patient, and he has given informed consent to proceed. DESCRIPTION OF THE PROCEDURE:  The patient was identified in the preoperative holding area. Informed consent was obtained. The operative site was marked. He was then transported to the OR and placed supine on the operating table. All bony prominences were well-padded. A tourniquet was applied to the upper brachium. After induction of general anesthesia, the right upper extremity was sterilely prepped and draped in the usual fashion. Surgical time-out was held. The operative site was confirmed. Preop antibiotics were not used. After gravity exsanguination, the tourniquet was elevated to 250 mmHg. A longitudinal incision was made in the palm over the A1 pulley. Large skin flaps were raised. The flexor sheath was identified. Radial and ulnar neurovascular bundles were identified and protected. The flexor sheath was incised. Purulent material was encountered. This was cultured. The entire A1 pulley was resected. Extensive tenosynovitis was debrided sharply with a 15-blade. A second oblique incision was made volarly over the DIP flexion crease. Again, large skin flaps were raised. A flexor sheath was identified. The flexor sheath was incised distal to the A4 pulley. This allowed for irrigation of the entire sheath with an flexible catheter. The sheath was then irrigated thoroughly with a liter of normal saline with bacitracin using small syringes and an 18 gauge catheter. The sheath was irrigated from both proximally and distally. The sheath was irrigated until there was no further purulence encountered. The wounds were also irrigated. The wound in the palm was very loosely closed with a single 3-0 nylon. Sterile dressings were applied. A well-padded, well-molded forearm based splint was applied. The tourniquet was let down and brisk cap refill returned to the digits.   He was transferred to the PACU in stable condition without complication        Isaac Najera MD [id number]  D:  03/01/2020 17:59  T:  03/02/2020 2:13  JOB #:  [id number]

## 2020-03-02 NOTE — PROGRESS NOTES
ID Progress Note  3/2/2020    Subjective: The patient is seen and examined. His chart is reviewed. He does have some complaint of pain in his hand. Objective:     Antibiotics:  1. Vancomycin  2. Flagyl  3. Cefepime      Vitals:   Visit Vitals  BP (!) 150/91 (BP 1 Location: Left arm)   Pulse 79   Temp 98 °F (36.7 °C)   Resp 14   Wt 77.8 kg (171 lb 8.3 oz)   SpO2 99%   BMI 24.61 kg/m²        Tmax:  Temp (24hrs), Av.7 °F (36.5 °C), Min:97.3 °F (36.3 °C), Max:98 °F (36.7 °C)      Exam:    His hand is dressed. There is no evidence of proximal or distal cellulitis. He has good neurovascular function of the hand. Labs:      Recent Labs     20  0347 20  0532 20  0353 20  1630   WBC 9.0 7.1 12.0* 12.2*   HGB 13.2 12.9 13.1 14.2    141* 132* 167   BUN 11 12 13 13   CREA 1.02 0.94 1.06 1.17   SGOT  --   --   --  8*   AP  --   --   --  101   TBILI  --   --   --  1.3*       Cultures:     Lab Results   Component Value Date/Time    Specimen Description: BLOOD 2012 09:15 AM    Specimen Description: MISC. WOUND FOREARM 2012 11:20 AM    Specimen Description: MISC. WOUND FOREARM 2012 11:20 AM     Lab Results   Component Value Date/Time    Culture result: NO GROWTH AFTER 18 HOURS 2020 05:42 PM    Culture result: NO GROWTH AFTER 18 HOURS 2020 05:42 PM    Culture result:  2020 01:20 PM     MRSA NOT PRESENT. Apparent Staphylococus aureus (not MRSA noted). Culture result:  2020 01:20 PM         Screening of patient nares for MRSA is for surveillance purposes and, if positive, to facilitate isolation considerations in high risk settings. It is not intended for automatic decolonization interventions per se as regimens are not sufficiently effective to warrant routine use. Radiology:     Line/Insert Date:           Assessment:     1. Soft tissue infection of the right index finger  2. Status post OR  3.  Cultures are negative to date    Objective:     1. Continue current therapy  2. Follow-up cultures and studies  3.  Hopefully, he will be able to be treated with an oral antibiotic    Goyo Ornelas MD

## 2020-03-02 NOTE — PROGRESS NOTES
[address] [facility]  Hospitalist Group                                                                                          Hospitalist Progress Note  Pavithra Hoffman MD  Answering service: [phone] OR [phone] from in house phone        Date of Service:  3/2/2020  NAME:  Rahul Camilo  :  1965  MRN:  [id number]      Admission Summary:   The patient is a 70-year-old male with past medical history of HIV and hepatitis, who presents to the hospital with the above-mentioned symptom. The patient came in with the right hand redness and swelling. The patient reports that he was chewing his nails at the side of the index finger and had somewhat skin break post that, this he had 2 days back. Does not have any recollection of any injury or trauma to the hand besides this. Reports that he started experiencing swelling, redness, and tenderness of his right hand. The hand was tight and he was not able to make a fist.  He reports that he does not have any insect bites or injuries that he can recollect. Interval history / Subjective:   S/p washout of the R index finger  Having some swelling of the L hand from infiltrated IV. Having difficulty moving his R index finger, but pain is well controlled. Assessment & Plan:     Cellulitis of the right hand -suspect there is also tenosynovitis  - MRI with tenosynovitis of flexor indicis tendon sheath  - now s/p washout in OR 3/01  -Erythema is receding.  -Orthopedics following, Dr. Clarence Salgado. -ID following  -Continue IV vancomycin, IV cefepime, IV Flagyl was added to morning    HIV - unknown last CD4 count. Per patient is somewhere between 200-300  -Continue Truvada, Tivicay  -Infectious disease following    Hepatitis C- unknown if this is been treated before  -Infectious disease already following, will check with their office to see if any RNA or viral loads have been done recently before ordering    Depression- continue SSRI  ?   ADHD- continue Adderall    Code status: Full  DVT prophylaxis: Lovenox (tomorrow)    Care Plan discussed with: Patient/Family  Anticipated Disposition: Home w/Family  Anticipated Discharge: Greater than 48 hours     Hospital Problems  Date Reviewed: 3/12/2012          Codes Class Noted POA    Cellulitis ICD-10-CM: L03.90  ICD-9-CM: 682.9  2/28/2020 Unknown                Review of Systems:   A comprehensive review of systems was negative except for that written in the HPI. Vital Signs:    Last 24hrs VS reviewed since prior progress note. Most recent are:  Visit Vitals  BP (!) 150/91 (BP 1 Location: Left arm)   Pulse 79   Temp 98 °F (36.7 °C)   Resp 14   Wt 77.8 kg (171 lb 8.3 oz)   SpO2 99%   BMI 24.61 kg/m²         Intake/Output Summary (Last 24 hours) at 3/2/2020 1452  Last data filed at 3/2/2020 1013  Gross per 24 hour   Intake 1200 ml   Output 1550 ml   Net -350 ml        Physical Examination:             Constitutional:  No acute distress, cooperative, pleasant. ENT:  Oral mucosa moist, oropharynx benign. Resp:  CTA bilaterally. No wheezing/rhonchi/rales. No accessory muscle use   CV:  Regular rhythm, normal rate, no murmurs, gallops, rubs    GI:  Soft, non distended, non tender. normoactive bowel sounds, no hepatosplenomegaly     Musculoskeletal:  R hand bandaged. L hand with swelling, no erythema. Neurologic:  Moves all extremities. AAOx3, CN II-XII reviewed     Psych:  Good insight, Not anxious nor agitated.        Data Review:    Review and/or order of clinical lab test  Review and/or order of tests in the radiology section of CPT  Review and/or order of tests in the medicine section of CPT      Labs:     Recent Labs     03/02/20 0347 03/01/20  0532   WBC 9.0 7.1   HGB 13.2 12.9   HCT 39.6 39.7    141*     Recent Labs     03/02/20  0347 03/01/20  0532 02/29/20  0353   * 139 137   K 4.1 4.0 4.1    107 106   CO2 26 28 25   BUN 11 12 13   CREA 1.02 0.94 1.06   * 102* 129* CA 8.9 8.5 8.7   MG 2.3 2.4  --    PHOS 3.4 2.8  --      Recent Labs     02/28/20  1630   SGOT 8*   ALT 20      TBILI 1.3*   TP 7.7   ALB 4.0   GLOB 3.7     No results for input(s): INR, PTP, APTT, INREXT, INREXT in the last 72 hours. No results for input(s): FE, TIBC, PSAT, FERR in the last 72 hours. No results found for: FOL, RBCF   No results for input(s): PH, PCO2, PO2 in the last 72 hours. No results for input(s): CPK, CKNDX, TROIQ in the last 72 hours.     No lab exists for component: CPKMB  No results found for: CHOL, CHOLX, CHLST, CHOLV, HDL, HDLP, LDL, LDLC, DLDLP, TGLX, TRIGL, TRIGP, CHHD, CHHDX  Lab Results   Component Value Date/Time    Glucose (POC) 96 03/01/2020 06:58 AM    Glucose (POC) 129 (H) 02/29/2020 09:39 PM     No results found for: COLOR, APPRN, SPGRU, REFSG, JESUS, PROTU, GLUCU, KETU, BILU, UROU, STEPHANIE, LEUKU, GLUKE, EPSU, BACTU, WBCU, RBCU, CASTS, UCRY      Medications Reviewed:     Current Facility-Administered Medications   Medication Dose Route Frequency    morphine injection 2 mg  2 mg IntraVENous Q4H PRN    oxyCODONE IR (ROXICODONE) tablet 5 mg  5 mg Oral Q4H PRN    metroNIDAZOLE (FLAGYL) IVPB premix 500 mg  500 mg IntraVENous Q12H    dolutegravir (TIVICAY) tablet 50 mg  50 mg Oral QHS    emtricitabine-tenofovir (TDF) (TRUVADA) 200-300 mg per tablet 1 Tab  1 Tab Oral QHS    dextroamphetamine-amphetamine (ADDERALL) tablet 20 mg  20 mg Oral DAILY    LORazepam (ATIVAN) tablet 0.5 mg  0.5 mg Oral BID    sertraline (ZOLOFT) tablet 200 mg  200 mg Oral DAILY    zolpidem (AMBIEN) tablet 10 mg  10 mg Oral QHS    sodium chloride (NS) flush 5-40 mL  5-40 mL IntraVENous Q8H    sodium chloride (NS) flush 5-40 mL  5-40 mL IntraVENous PRN    acetaminophen (TYLENOL) tablet 650 mg  650 mg Oral Q4H PRN    heparin (porcine) injection 5,000 Units  5,000 Units SubCUTAneous Q8H    cefepime (MAXIPIME) 2 g in 0.9% sodium chloride (MBP/ADV) 100 mL  2 g IntraVENous Q12H    Vancomycin- pharmacy to dose   Other Rx Dosing/Monitoring    vancomycin (VANCOCIN) 1,000 mg in 0.9% sodium chloride (MBP/ADV) 250 mL  1,000 mg IntraVENous Q12H     ______________________________________________________________________  EXPECTED LENGTH OF STAY: 4d 2h  ACTUAL LENGTH OF STAY:          3                 So Santos MD

## 2020-03-03 LAB
ANION GAP SERPL CALC-SCNC: 6 MMOL/L (ref 5–15)
BASOPHILS # BLD: 0.1 K/UL (ref 0–0.1)
BASOPHILS NFR BLD: 1 % (ref 0–1)
BUN SERPL-MCNC: 14 MG/DL (ref 6–20)
BUN/CREAT SERPL: 14 (ref 12–20)
CALCIUM SERPL-MCNC: 9 MG/DL (ref 8.5–10.1)
CHLORIDE SERPL-SCNC: 105 MMOL/L (ref 97–108)
CO2 SERPL-SCNC: 27 MMOL/L (ref 21–32)
CREAT SERPL-MCNC: 0.98 MG/DL (ref 0.7–1.3)
DIFFERENTIAL METHOD BLD: ABNORMAL
EOSINOPHIL # BLD: 0.1 K/UL (ref 0–0.4)
EOSINOPHIL NFR BLD: 2 % (ref 0–7)
ERYTHROCYTE [DISTWIDTH] IN BLOOD BY AUTOMATED COUNT: 14.5 % (ref 11.5–14.5)
GLUCOSE SERPL-MCNC: 110 MG/DL (ref 65–100)
HCT VFR BLD AUTO: 42 % (ref 36.6–50.3)
HGB BLD-MCNC: 13.6 G/DL (ref 12.1–17)
IMM GRANULOCYTES # BLD AUTO: 0.1 K/UL (ref 0–0.04)
IMM GRANULOCYTES NFR BLD AUTO: 2 % (ref 0–0.5)
LYMPHOCYTES # BLD: 1.6 K/UL (ref 0.8–3.5)
LYMPHOCYTES NFR BLD: 29 % (ref 12–49)
MAGNESIUM SERPL-MCNC: 2.5 MG/DL (ref 1.6–2.4)
MCH RBC QN AUTO: 31.7 PG (ref 26–34)
MCHC RBC AUTO-ENTMCNC: 32.4 G/DL (ref 30–36.5)
MCV RBC AUTO: 97.9 FL (ref 80–99)
MONOCYTES # BLD: 0.5 K/UL (ref 0–1)
MONOCYTES NFR BLD: 9 % (ref 5–13)
NEUTS SEG # BLD: 3 K/UL (ref 1.8–8)
NEUTS SEG NFR BLD: 57 % (ref 32–75)
NRBC # BLD: 0 K/UL (ref 0–0.01)
NRBC BLD-RTO: 0 PER 100 WBC
PHOSPHATE SERPL-MCNC: 3 MG/DL (ref 2.6–4.7)
PLATELET # BLD AUTO: 190 K/UL (ref 150–400)
PMV BLD AUTO: 8.7 FL (ref 8.9–12.9)
POTASSIUM SERPL-SCNC: 3.7 MMOL/L (ref 3.5–5.1)
RBC # BLD AUTO: 4.29 M/UL (ref 4.1–5.7)
RBC MORPH BLD: ABNORMAL
SODIUM SERPL-SCNC: 138 MMOL/L (ref 136–145)
WBC # BLD AUTO: 5.4 K/UL (ref 4.1–11.1)

## 2020-03-03 PROCEDURE — 74011250637 HC RX REV CODE- 250/637: Performed by: INTERNAL MEDICINE

## 2020-03-03 PROCEDURE — 74011250637 HC RX REV CODE- 250/637: Performed by: ORTHOPAEDIC SURGERY

## 2020-03-03 PROCEDURE — 74011250636 HC RX REV CODE- 250/636: Performed by: ORTHOPAEDIC SURGERY

## 2020-03-03 PROCEDURE — 83735 ASSAY OF MAGNESIUM: CPT

## 2020-03-03 PROCEDURE — 85025 COMPLETE CBC W/AUTO DIFF WBC: CPT

## 2020-03-03 PROCEDURE — 80048 BASIC METABOLIC PNL TOTAL CA: CPT

## 2020-03-03 PROCEDURE — 65270000029 HC RM PRIVATE

## 2020-03-03 PROCEDURE — 74011000258 HC RX REV CODE- 258: Performed by: ORTHOPAEDIC SURGERY

## 2020-03-03 PROCEDURE — 36415 COLL VENOUS BLD VENIPUNCTURE: CPT

## 2020-03-03 PROCEDURE — 94760 N-INVAS EAR/PLS OXIMETRY 1: CPT

## 2020-03-03 PROCEDURE — 84100 ASSAY OF PHOSPHORUS: CPT

## 2020-03-03 RX ADMIN — HEPARIN SODIUM 5000 UNITS: 5000 INJECTION INTRAVENOUS; SUBCUTANEOUS at 22:12

## 2020-03-03 RX ADMIN — HEPARIN SODIUM 5000 UNITS: 5000 INJECTION INTRAVENOUS; SUBCUTANEOUS at 06:57

## 2020-03-03 RX ADMIN — OXYCODONE 5 MG: 5 TABLET ORAL at 17:26

## 2020-03-03 RX ADMIN — EMTRICITABINE AND TENOFOVIR DISOPROXIL FUMARATE 1 TABLET: 200; 300 TABLET, FILM COATED ORAL at 22:12

## 2020-03-03 RX ADMIN — LORAZEPAM 0.5 MG: 0.5 TABLET ORAL at 17:26

## 2020-03-03 RX ADMIN — Medication 20 ML: at 14:10

## 2020-03-03 RX ADMIN — OXYCODONE 5 MG: 5 TABLET ORAL at 09:50

## 2020-03-03 RX ADMIN — SERTRALINE HYDROCHLORIDE 200 MG: 50 TABLET ORAL at 09:50

## 2020-03-03 RX ADMIN — Medication 10 ML: at 14:31

## 2020-03-03 RX ADMIN — HEPARIN SODIUM 5000 UNITS: 5000 INJECTION INTRAVENOUS; SUBCUTANEOUS at 16:00

## 2020-03-03 RX ADMIN — Medication 10 ML: at 10:58

## 2020-03-03 RX ADMIN — ZOLPIDEM TARTRATE 10 MG: 5 TABLET ORAL at 22:12

## 2020-03-03 RX ADMIN — METRONIDAZOLE 500 MG: 500 INJECTION, SOLUTION INTRAVENOUS at 05:07

## 2020-03-03 RX ADMIN — DOLUTEGRAVIR SODIUM 50 MG: 50 TABLET, FILM COATED ORAL at 22:12

## 2020-03-03 RX ADMIN — CEFEPIME HYDROCHLORIDE 2 G: 2 INJECTION, POWDER, FOR SOLUTION INTRAVENOUS at 14:31

## 2020-03-03 RX ADMIN — OXYCODONE 5 MG: 5 TABLET ORAL at 22:18

## 2020-03-03 RX ADMIN — LORAZEPAM 0.5 MG: 0.5 TABLET ORAL at 09:51

## 2020-03-03 RX ADMIN — VANCOMYCIN HYDROCHLORIDE 1000 MG: 1 INJECTION, POWDER, LYOPHILIZED, FOR SOLUTION INTRAVENOUS at 02:04

## 2020-03-03 RX ADMIN — VANCOMYCIN HYDROCHLORIDE 1000 MG: 1 INJECTION, POWDER, LYOPHILIZED, FOR SOLUTION INTRAVENOUS at 17:22

## 2020-03-03 RX ADMIN — Medication 10 ML: at 05:09

## 2020-03-03 RX ADMIN — METRONIDAZOLE 500 MG: 500 INJECTION, SOLUTION INTRAVENOUS at 20:03

## 2020-03-03 NOTE — PROGRESS NOTES
Problem: Falls - Risk of  Goal: *Absence of Falls  Description  Document Oneidahong Ollie Fall Risk and appropriate interventions in the flowsheet.   Outcome: Progressing Towards Goal  Note: Fall Risk Interventions:            Medication Interventions: Patient to call before getting OOB, Evaluate medications/consider consulting pharmacy, Teach patient to arise slowly    Elimination Interventions: Call light in reach, Urinal in reach

## 2020-03-03 NOTE — PROGRESS NOTES
POD#2 I&D right IF FTS  Pain improved    Dressing changed  Hand continues to improve    Wbc 5.4  cx NGTD    Follow cultures  Cont iv abx  Ice and elevate  Home on po abx in hopefully 1-2 days

## 2020-03-03 NOTE — PROGRESS NOTES
[address] [facility] Adult  Hospitalist Group                                                                                          Hospitalist Progress Note  Dahiana Larkin MD  Answering service: [phone] OR [phone] from in house phone        Date of Service:  3/3/2020  NAME:  Adolfo Dorsey  :  1965  MRN:  [id number]      Admission Summary:   The patient is a 53-year-old male with past medical history of HIV and hepatitis, who presents to the hospital with the above-mentioned symptom. The patient came in with the right hand redness and swelling. The patient reports that he was chewing his nails at the side of the index finger and had somewhat skin break post that, this he had 2 days back. Does not have any recollection of any injury or trauma to the hand besides this. Reports that he started experiencing swelling, redness, and tenderness of his right hand. The hand was tight and he was not able to make a fist.  He reports that he does not have any insect bites or injuries that he can recollect. Interval history / Subjective:   S/p washout of the R index finger  Pain overall better, denies any fevers or chills. Culture still pending, potentially anaerobe growing     Assessment & Plan:     Cellulitis of the right hand -suspect there is also tenosynovitis  - MRI with tenosynovitis of flexor indicis tendon sheath  - now s/p washout in OR 3/01  -Erythema is receding.  -Orthopedics following, Dr. Nickolas Alvarado. -ID following, potential anaerobic bacteria from Intra-Op cultures.  -Follow-up Intra-Op cultures  -Continue IV vancomycin, IV cefepime, IV Flagyl was added to morning    HIV - unknown last CD4 count.   Per patient is somewhere between 200-300  -Continue Truvada, Tivicay  -Infectious disease following    Hepatitis C- unknown if this is been treated before  -Infectious disease already following, will check with their office to see if any RNA or viral loads have been done recently before ordering    Depression- continue SSRI  ? ADHD- continue Adderall    Code status: Full  DVT prophylaxis: Lovenox (tomorrow)    Care Plan discussed with: Patient/Family  Anticipated Disposition: Home w/Family  Anticipated Discharge: Greater than 48 hours     Hospital Problems  Date Reviewed: 3/12/2012          Codes Class Noted POA    Cellulitis ICD-10-CM: L03.90  ICD-9-CM: 682.9  2/28/2020 Unknown                Review of Systems:   A comprehensive review of systems was negative except for that written in the HPI. Vital Signs:    Last 24hrs VS reviewed since prior progress note. Most recent are:  Visit Vitals  /85 (BP 1 Location: Left arm, BP Patient Position: Sitting)   Pulse 80   Temp 98 °F (36.7 °C)   Resp 16   Ht 5' 10\" (1.778 m)   Wt 77.8 kg (171 lb 8.3 oz)   SpO2 95%   BMI 24.61 kg/m²         Intake/Output Summary (Last 24 hours) at 3/3/2020 1424  Last data filed at 3/2/2020 2113  Gross per 24 hour   Intake    Output 500 ml   Net -500 ml        Physical Examination:             Constitutional:  No acute distress, cooperative, pleasant. ENT:  Oral mucosa moist, oropharynx benign. Resp:  CTA bilaterally. No wheezing/rhonchi/rales. No accessory muscle use   CV:  Regular rhythm, normal rate, no murmurs, gallops, rubs    GI:  Soft, non distended, non tender. normoactive bowel sounds, no hepatosplenomegaly     Musculoskeletal:  R hand bandaged. L hand with swelling, no erythema. Neurologic:  Moves all extremities. AAOx3, CN II-XII reviewed     Psych:  Good insight, Not anxious nor agitated.        Data Review:    Review and/or order of clinical lab test  Review and/or order of tests in the radiology section of CPT  Review and/or order of tests in the medicine section of CPT      Labs:     Recent Labs     03/03/20 0232 03/02/20 0347   WBC 5.4 9.0   HGB 13.6 13.2   HCT 42.0 39.6    197     Recent Labs     03/03/20  0232 03/02/20 0347 03/01/20  0532    135* 139   K 3.7 4.1 4.0  103 107   CO2 27 26 28   BUN 14 11 12   CREA 0.98 1.02 0.94   * 116* 102*   CA 9.0 8.9 8.5   MG 2.5* 2.3 2.4   PHOS 3.0 3.4 2.8     No results for input(s): SGOT, GPT, ALT, AP, TBIL, TBILI, TP, ALB, GLOB, GGT, AML, LPSE in the last 72 hours. No lab exists for component: AMYP, HLPSE  No results for input(s): INR, PTP, APTT, INREXT, INREXT in the last 72 hours. No results for input(s): FE, TIBC, PSAT, FERR in the last 72 hours. No results found for: FOL, RBCF   No results for input(s): PH, PCO2, PO2 in the last 72 hours. No results for input(s): CPK, CKNDX, TROIQ in the last 72 hours.     No lab exists for component: CPKMB  No results found for: CHOL, CHOLX, CHLST, CHOLV, HDL, HDLP, LDL, LDLC, DLDLP, TGLX, TRIGL, TRIGP, CHHD, CHHDX  Lab Results   Component Value Date/Time    Glucose (POC) 96 03/01/2020 06:58 AM    Glucose (POC) 129 (H) 02/29/2020 09:39 PM     No results found for: COLOR, APPRN, SPGRU, REFSG, JESUS, PROTU, GLUCU, KETU, BILU, UROU, STEPHANIE, LEUKU, GLUKE, EPSU, BACTU, WBCU, RBCU, CASTS, UCRY      Medications Reviewed:     Current Facility-Administered Medications   Medication Dose Route Frequency    morphine injection 2 mg  2 mg IntraVENous Q4H PRN    oxyCODONE IR (ROXICODONE) tablet 5 mg  5 mg Oral Q4H PRN    metroNIDAZOLE (FLAGYL) IVPB premix 500 mg  500 mg IntraVENous Q12H    dolutegravir (TIVICAY) tablet 50 mg  50 mg Oral QHS    emtricitabine-tenofovir (TDF) (TRUVADA) 200-300 mg per tablet 1 Tab  1 Tab Oral QHS    dextroamphetamine-amphetamine (ADDERALL) tablet 20 mg  20 mg Oral DAILY    LORazepam (ATIVAN) tablet 0.5 mg  0.5 mg Oral BID    sertraline (ZOLOFT) tablet 200 mg  200 mg Oral DAILY    zolpidem (AMBIEN) tablet 10 mg  10 mg Oral QHS    sodium chloride (NS) flush 5-40 mL  5-40 mL IntraVENous Q8H    sodium chloride (NS) flush 5-40 mL  5-40 mL IntraVENous PRN    acetaminophen (TYLENOL) tablet 650 mg  650 mg Oral Q4H PRN    heparin (porcine) injection 5,000 Units 5,000 Units SubCUTAneous Q8H    cefepime (MAXIPIME) 2 g in 0.9% sodium chloride (MBP/ADV) 100 mL  2 g IntraVENous Q12H    Vancomycin- pharmacy to dose   Other Rx Dosing/Monitoring    vancomycin (VANCOCIN) 1,000 mg in 0.9% sodium chloride (MBP/ADV) 250 mL  1,000 mg IntraVENous Q12H     ______________________________________________________________________  EXPECTED LENGTH OF STAY: 4d 2h  ACTUAL LENGTH OF STAY:          4                 Mariposa Hdz MD

## 2020-03-03 NOTE — PROGRESS NOTES
ID Progress Note  3/3/2020    Subjective: The patient is seen and examined. His chart is reviewed. He does have some complaint of pain in his hand, but is feeling better overall. Objective:     Antibiotics:  1. Vancomycin  2. Flagyl  3. Cefepime      Vitals:   Visit Vitals  /85 (BP 1 Location: Left arm, BP Patient Position: Sitting)   Pulse 80   Temp 98 °F (36.7 °C)   Resp 16   Ht 5' 10\" (1.778 m)   Wt 77.8 kg (171 lb 8.3 oz)   SpO2 95%   BMI 24.61 kg/m²        Tmax:  Temp (24hrs), Av °F (36.7 °C), Min:97.7 °F (36.5 °C), Max:98.1 °F (36.7 °C)      Exam:    His hand is dressed. There is no evidence of proximal or distal cellulitis. He has good neurovascular function of the hand. Labs:      Recent Labs     20  0232 20  0347 20  0532   WBC 5.4 9.0 7.1   HGB 13.6 13.2 12.9    197 141*   BUN 14 11 12   CREA 0.98 1.02 0.94       Cultures:     Lab Results   Component Value Date/Time    Specimen Description: BLOOD 2012 09:15 AM    Specimen Description: MISC. WOUND FOREARM 2012 11:20 AM    Specimen Description: MISC. WOUND FOREARM 2012 11:20 AM     Lab Results   Component Value Date/Time    Culture result: CHECKING FOR POSSIBLE  ANAEROBE   2020 05:42 PM    Culture result: NO GROWTH 2 DAYS 2020 05:42 PM    Culture result:  2020 01:20 PM     MRSA NOT PRESENT. Apparent Staphylococus aureus (not MRSA noted). Culture result:  2020 01:20 PM         Screening of patient nares for MRSA is for surveillance purposes and, if positive, to facilitate isolation considerations in high risk settings. It is not intended for automatic decolonization interventions per se as regimens are not sufficiently effective to warrant routine use. Radiology:     Line/Insert Date:           Assessment:     1. Soft tissue infection of the right index finger--?anaerobe growing from culture--not a usual pathogen  2. Status post OR    Objective:     1.  Continue current therapy  2. Follow-up cultures and studies  3.  Hopefully, he will be able to be treated with an oral antibiotic    Goyo Ornelas MD

## 2020-03-03 NOTE — PROGRESS NOTES
Bedside and Verbal shift change report given to Johanna Iraheta RN (oncoming nurse) by Alina Ferrer RN (offgoing nurse). Report included the following information SBAR, Kardex, Intake/Output, MAR and Recent Results.

## 2020-03-03 NOTE — PROGRESS NOTES
3/3/20 - 12:00 pm, spoke with IV team to start IV but they could not do it. IV team too busy. 3/3/20 - 12:40 pm, spoke with Katharine at CCU to have someone start IV. Ignacio Khoury indicated that someone would coming asap. 3/3/20 - 13:49pm, spoke with ICU and they are coming to start IV.

## 2020-03-03 NOTE — PROGRESS NOTES
Bedside and Verbal shift change report given to Chato Voss (oncoming nurse) by Sue Miguel (offgoing nurse). Report included the following information SBAR, Kardex, Intake/Output, MAR and Recent Results.

## 2020-03-04 VITALS
OXYGEN SATURATION: 97 % | WEIGHT: 171 LBS | TEMPERATURE: 97.9 F | SYSTOLIC BLOOD PRESSURE: 135 MMHG | HEIGHT: 70 IN | RESPIRATION RATE: 14 BRPM | DIASTOLIC BLOOD PRESSURE: 89 MMHG | HEART RATE: 76 BPM | BODY MASS INDEX: 24.48 KG/M2

## 2020-03-04 PROBLEM — M65.9 TENOSYNOVITIS: Status: ACTIVE | Noted: 2020-03-04

## 2020-03-04 LAB
ANION GAP SERPL CALC-SCNC: 6 MMOL/L (ref 5–15)
BACTERIA SPEC CULT: ABNORMAL
BACTERIA SPEC CULT: NORMAL
BACTERIA SPEC CULT: NORMAL
BASOPHILS # BLD: 0.1 K/UL (ref 0–0.1)
BASOPHILS NFR BLD: 1 % (ref 0–1)
BUN SERPL-MCNC: 16 MG/DL (ref 6–20)
BUN/CREAT SERPL: 15 (ref 12–20)
CALCIUM SERPL-MCNC: 8.9 MG/DL (ref 8.5–10.1)
CHLORIDE SERPL-SCNC: 104 MMOL/L (ref 97–108)
CO2 SERPL-SCNC: 27 MMOL/L (ref 21–32)
CREAT SERPL-MCNC: 1.1 MG/DL (ref 0.7–1.3)
DIFFERENTIAL METHOD BLD: ABNORMAL
EOSINOPHIL # BLD: 0.1 K/UL (ref 0–0.4)
EOSINOPHIL NFR BLD: 1 % (ref 0–7)
ERYTHROCYTE [DISTWIDTH] IN BLOOD BY AUTOMATED COUNT: 14.5 % (ref 11.5–14.5)
GLUCOSE SERPL-MCNC: 126 MG/DL (ref 65–100)
GRAM STN SPEC: ABNORMAL
GRAM STN SPEC: ABNORMAL
HCT VFR BLD AUTO: 40.5 % (ref 36.6–50.3)
HGB BLD-MCNC: 13.2 G/DL (ref 12.1–17)
IMM GRANULOCYTES # BLD AUTO: 0 K/UL
IMM GRANULOCYTES NFR BLD AUTO: 0 %
LYMPHOCYTES # BLD: 1.8 K/UL (ref 0.8–3.5)
LYMPHOCYTES NFR BLD: 31 % (ref 12–49)
MAGNESIUM SERPL-MCNC: 2.4 MG/DL (ref 1.6–2.4)
MCH RBC QN AUTO: 31.8 PG (ref 26–34)
MCHC RBC AUTO-ENTMCNC: 32.6 G/DL (ref 30–36.5)
MCV RBC AUTO: 97.6 FL (ref 80–99)
MONOCYTES # BLD: 0.6 K/UL (ref 0–1)
MONOCYTES NFR BLD: 10 % (ref 5–13)
MYELOCYTES NFR BLD MANUAL: 2 %
NEUTS BAND NFR BLD MANUAL: 3 % (ref 0–6)
NEUTS SEG # BLD: 3.2 K/UL (ref 1.8–8)
NEUTS SEG NFR BLD: 52 % (ref 32–75)
NRBC # BLD: 0 K/UL (ref 0–0.01)
NRBC BLD-RTO: 0 PER 100 WBC
PHOSPHATE SERPL-MCNC: 3.1 MG/DL (ref 2.6–4.7)
PLATELET # BLD AUTO: 213 K/UL (ref 150–400)
PMV BLD AUTO: 8.6 FL (ref 8.9–12.9)
POTASSIUM SERPL-SCNC: 4.1 MMOL/L (ref 3.5–5.1)
RBC # BLD AUTO: 4.15 M/UL (ref 4.1–5.7)
RBC MORPH BLD: ABNORMAL
RBC MORPH BLD: ABNORMAL
SERVICE CMNT-IMP: ABNORMAL
SERVICE CMNT-IMP: NORMAL
SERVICE CMNT-IMP: NORMAL
SODIUM SERPL-SCNC: 137 MMOL/L (ref 136–145)
WBC # BLD AUTO: 5.8 K/UL (ref 4.1–11.1)

## 2020-03-04 PROCEDURE — 83735 ASSAY OF MAGNESIUM: CPT

## 2020-03-04 PROCEDURE — 74011250637 HC RX REV CODE- 250/637: Performed by: INTERNAL MEDICINE

## 2020-03-04 PROCEDURE — 74011250637 HC RX REV CODE- 250/637: Performed by: ORTHOPAEDIC SURGERY

## 2020-03-04 PROCEDURE — 74011250636 HC RX REV CODE- 250/636: Performed by: ORTHOPAEDIC SURGERY

## 2020-03-04 PROCEDURE — 80048 BASIC METABOLIC PNL TOTAL CA: CPT

## 2020-03-04 PROCEDURE — 36415 COLL VENOUS BLD VENIPUNCTURE: CPT

## 2020-03-04 PROCEDURE — 74011000258 HC RX REV CODE- 258: Performed by: ORTHOPAEDIC SURGERY

## 2020-03-04 PROCEDURE — 85025 COMPLETE CBC W/AUTO DIFF WBC: CPT

## 2020-03-04 PROCEDURE — 84100 ASSAY OF PHOSPHORUS: CPT

## 2020-03-04 RX ORDER — OXYCODONE HYDROCHLORIDE 5 MG/1
5 TABLET ORAL
Qty: 30 TAB | Refills: 0 | Status: SHIPPED | OUTPATIENT
Start: 2020-03-04 | End: 2020-03-11

## 2020-03-04 RX ORDER — DIPHENHYDRAMINE HCL 25 MG
25 CAPSULE ORAL
Status: DISCONTINUED | OUTPATIENT
Start: 2020-03-04 | End: 2020-03-04 | Stop reason: HOSPADM

## 2020-03-04 RX ORDER — AMOXICILLIN 500 MG/1
500 CAPSULE ORAL 3 TIMES DAILY
Qty: 30 CAP | Refills: 0 | Status: SHIPPED | OUTPATIENT
Start: 2020-03-04 | End: 2020-03-14

## 2020-03-04 RX ADMIN — LORAZEPAM 0.5 MG: 0.5 TABLET ORAL at 10:49

## 2020-03-04 RX ADMIN — CEFEPIME HYDROCHLORIDE 2 G: 2 INJECTION, POWDER, FOR SOLUTION INTRAVENOUS at 13:57

## 2020-03-04 RX ADMIN — HEPARIN SODIUM 5000 UNITS: 5000 INJECTION INTRAVENOUS; SUBCUTANEOUS at 14:00

## 2020-03-04 RX ADMIN — SERTRALINE HYDROCHLORIDE 200 MG: 50 TABLET ORAL at 10:49

## 2020-03-04 RX ADMIN — HEPARIN SODIUM 5000 UNITS: 5000 INJECTION INTRAVENOUS; SUBCUTANEOUS at 07:18

## 2020-03-04 RX ADMIN — DIPHENHYDRAMINE HYDROCHLORIDE 25 MG: 25 CAPSULE ORAL at 07:36

## 2020-03-04 RX ADMIN — DEXTROAMPHETAMINE SACCHARATE, AMPHETAMINE ASPARTATE, DEXTROAMPHETAMINE SULFATE AND AMPHETAMINE SULFATE 20 MG: 2.5; 2.5; 2.5; 2.5 TABLET ORAL at 10:49

## 2020-03-04 RX ADMIN — VANCOMYCIN HYDROCHLORIDE 1000 MG: 1 INJECTION, POWDER, LYOPHILIZED, FOR SOLUTION INTRAVENOUS at 04:51

## 2020-03-04 RX ADMIN — CEFEPIME HYDROCHLORIDE 2 G: 2 INJECTION, POWDER, FOR SOLUTION INTRAVENOUS at 01:43

## 2020-03-04 RX ADMIN — METRONIDAZOLE 500 MG: 500 INJECTION, SOLUTION INTRAVENOUS at 10:29

## 2020-03-04 RX ADMIN — OXYCODONE 5 MG: 5 TABLET ORAL at 01:47

## 2020-03-04 NOTE — PROGRESS NOTES
Bedside and Verbal shift change report given to Anthony Abbott RN (oncoming nurse) by Garrett Soares RN (offgoing nurse). Report included the following information SBAR, Kardex, Intake/Output, MAR and Recent Results.

## 2020-03-04 NOTE — PROGRESS NOTES
ID Progress Note  3/4/2020    Subjective:     Doing ok today--minimal pain    Objective:     Antibiotics:  1. Vancomycin  2. Flagyl  3. Cefepime      Vitals:   Visit Vitals  /84   Pulse 62   Temp 98.9 °F (37.2 °C)   Resp 14   Ht 5' 10\" (1.778 m)   Wt 77.6 kg (171 lb)   SpO2 94%   BMI 24.54 kg/m²        Tmax:  Temp (24hrs), Av.3 °F (36.8 °C), Min:98 °F (36.7 °C), Max:98.9 °F (37.2 °C)      Exam:    His hand is dressed. There is no evidence of proximal or distal cellulitis. He has good neurovascular function of the hand. Labs:      Recent Labs     20  0141 20  0232 20  0347   WBC 5.8 5.4 9.0   HGB 13.2 13.6 13.2    190 197   BUN 16 14 11   CREA 1.10 0.98 1.02       Cultures:     Lab Results   Component Value Date/Time    Specimen Description: BLOOD 2012 09:15 AM    Specimen Description: MISC. WOUND FOREARM 2012 11:20 AM    Specimen Description: MISC. WOUND FOREARM 2012 11:20 AM     Lab Results   Component Value Date/Time    Culture result: NO ANAEROBES ISOLATED 2020 05:42 PM    Culture result: (A) 2020 05:42 PM     RARE STREPTOCOCCI, BETA HEMOLYTIC GROUP G Penicillin and ampicillin are drugs of choice for treatment of beta-hemolytic streptococcal infections. Susceptibility testing of penicillins and beta-lactams approved by the FDA for treatment of beta-hemolytic streptococcal infections need not be performed routinely, because nonsusceptible isolates are extremely rare. CLSI     Culture result:  2020 01:20 PM     MRSA NOT PRESENT. Apparent Staphylococus aureus (not MRSA noted). Culture result:  2020 01:20 PM         Screening of patient nares for MRSA is for surveillance purposes and, if positive, to facilitate isolation considerations in high risk settings. It is not intended for automatic decolonization interventions per se as regimens are not sufficiently effective to warrant routine use.        Radiology:     Line/Insert Date: Assessment:     1. Soft tissue infection of the right index finger--group G strep from culture  2. Status post OR    Objective:     1. Continue current therapy  2. Follow-up cultures and studies  3. Home on oral amoxicillin 500 mg tid for 7-10 more days  4.  Case discussed    Zack Olivas MD

## 2020-03-04 NOTE — PROGRESS NOTES
Dr. Tala Pena said patient was okay to drive himself. Reviewed discharge instructions and gave patient prescriptions. Patient acknowledged understanding of everything. Patient was sent home with all belongings.

## 2020-03-04 NOTE — PROGRESS NOTES
Bedside and Verbal shift change report given to Milagros Carreno (oncoming nurse) by Lilli Forrest (offgoing nurse). Report included the following information SBAR, Kardex, Intake/Output, MAR and Recent Results.

## 2020-03-04 NOTE — PROGRESS NOTES
Problem: Falls - Risk of  Goal: *Absence of Falls  Description  Document Adair Monson Fall Risk and appropriate interventions in the flowsheet.   Outcome: Progressing Towards Goal  Note: Fall Risk Interventions:            Medication Interventions: Patient to call before getting OOB    Elimination Interventions: Call light in reach

## 2020-03-04 NOTE — DISCHARGE SUMMARY
Discharge Summary       PATIENT ID: Blossom Matthews  MRN: [id number]   YOB: 1965    DATE OF ADMISSION: 2/28/2020  6:12 PM    DATE OF DISCHARGE: 03/04/2020   PRIMARY CARE PROVIDER: Sheri Torres MD     DISCHARGING PROVIDER: Valarie Mendoza MD    To contact this individual call [phone] and ask the  to page. If unavailable ask to be transferred the Adult Hospitalist Department. CONSULTATIONS: IP CONSULT TO ORTHOPEDIC SURGERY  IP CONSULT TO INFECTIOUS DISEASES    PROCEDURES/SURGERIES: Procedure(s):  INCISION AND DRAINAGE RIGHT HAND    ADMITTING DIAGNOSES & HOSPITAL COURSE:   Cellulitis, tenosynovitis - R hand    The patient is a 48-year-old male with past medical history of HIV and hepatitis, who presents to the hospital with the above-mentioned symptom.  The patient came in with the right hand redness and swelling.  The patient reports that he was chewing his nails at the side of the index finger and had somewhat skin break post that, this he had 2 days back.  Does not have any recollection of any injury or trauma to the hand besides this.  Reports that he started experiencing swelling, redness, and tenderness of his right hand.  The hand was tight and he was not able to make a fist.  He reports that he does not have any insect bites or injuries that he can recollect. Admitted and started on IV antibitoics. MRI of the hand was done which showed extensive tenosynovitis, and hand surgery decided to take him to the OR for washout. He underwent Irrigation and debridement of right index finger flexor sheath for suppurative flexor tenosynovitis on 03/01. Infectious disease was consulted on admission. OR cultures grew beta hemolytic strep. Discussed with ID on 3/4, and will plan to DC home on amoxicillin for a total of 10 additional days. Stable for DC home, to follow up with Dr. Yinka Vasquez in 1 week.       DISCHARGE DIAGNOSES / PLAN:      Cellulitis of the right hand -suspect there is also tenosynovitis  - MRI with tenosynovitis of flexor indicis tendon sheath  - now s/p washout in OR 3/01  -Erythema is receding.  -Orthopedics following, Dr. Maylin Koenig. FU in 1 week. -ID following, follow up as previously scheduled  -Intra Op cultures growing beta hemolytic strep  -Discontinue IV vancomycin, IV cefepime, IV Flagyl   - DC on oral amoxicillin 500mg TID     HIV - unknown last CD4 count. Per patient is somewhere between 200-300  -Continue Truvada, Tivicay  -Infectious disease following     Hepatitis C- unknown if this is been treated before  -Infectious disease already following, will check with their office to see if any RNA or viral loads have been done recently before ordering     Depression- continue SSRI  ? ADHD- continue Adderall     ADDITIONAL CARE RECOMMENDATIONS:   - Please take all medications as prescribed. Note changes as below. ** Add amoxicillin 500mg TID for 10 days  ** Add as needed pain medications. - Please make sure to follow up with your primary care physician within 1-2 weeks of discharge for hospital follow up. Make sure to follow up with Dr. Maylin Koenig in 1 week. Follow up with Dr. Rishi Villarreal as previously scheduled for regular follow up. - Please make sure to continue to monitor for: worsening pain, increased swelling, fevers or chills and return to the ED with any of these signs.   - Please get up slowly from a seated or laying position, avoid falls. - Avoid tobacco, alcohol and other illicit drug use. - Narcotic medications can cause constipation, make sure to take as needed stool softeners over the counter.          PENDING TEST RESULTS:   At the time of discharge the following test results are still pending: None    FOLLOW UP APPOINTMENTS:    Follow-up Information     Follow up With Specialties Details Why Contact Info    Richie Samuel MD [facility] In 1 week  [address]  [address] [facility]  [facility]      Michelle Webster MD Infectious Diseases In 1 month As previously scheduled  [address] [facility]      Diogo Vuong MD Orthopedic Surgery In 1 week  [address]  [phone]               DIET: Regular Diet    ACTIVITY: Activity as tolerated    WOUND CARE: None    EQUIPMENT needed: None      DISCHARGE MEDICATIONS:  Current Discharge Medication List      START taking these medications    Details   oxyCODONE IR (ROXICODONE) 5 mg immediate release tablet Take 1 Tab by mouth every four (4) hours as needed for Pain for up to 7 days. Max Daily Amount: 30 mg.  Qty: 30 Tab, Refills: 0    Associated Diagnoses: Tenosynovitis      amoxicillin (AMOXIL) 500 mg capsule Take 1 Cap by mouth three (3) times daily for 10 days. Qty: 30 Cap, Refills: 0         CONTINUE these medications which have NOT CHANGED    Details   sertraline (ZOLOFT) 100 mg tablet Take 200 mg by mouth daily. LORazepam (ATIVAN) 0.5 mg tablet Take 0.5 mg by mouth two (2) times a day. !! dextroamphetamine-amphetamine (ADDERALL) 20 mg tablet Take 20 mg by mouth daily. !! dextroamphetamine-amphetamine (ADDERALL) 20 mg tablet Take 20 mg by mouth daily as needed. Takes in the evening if necessary      cetirizine (ZYRTEC) 10 mg tablet Take 10 mg by mouth nightly. zolpidem (AMBIEN) 10 mg tablet Take 10 mg by mouth nightly. dolutegravir (TIVICAY) 50 mg tab tablet Take 50 mg by mouth every evening.      emtricitabine-tenofovir, TDF, (TRUVADA) 200-300 mg per tablet Take 1 Tab by mouth every evening. !! - Potential duplicate medications found. Please discuss with provider. NOTIFY YOUR PHYSICIAN FOR ANY OF THE FOLLOWING:   Fever over 101 degrees for 24 hours. Chest pain, shortness of breath, fever, chills, nausea, vomiting, diarrhea, change in mentation, falling, weakness, bleeding. Severe pain or pain not relieved by medications.   Or, any other signs or symptoms that you may have questions about. DISPOSITION:  X  Home With:   OT  PT  HH  RN       Long term SNF/Inpatient Rehab    Independent/assisted living    Hospice    Other:       PATIENT CONDITION AT DISCHARGE:     Functional status    Poor     Deconditioned    X Independent      Cognition    X Lucid     Forgetful     Dementia      Catheters/lines (plus indication)    Nolen     PICC     PEG    X None      Code status   X  Full code     DNR      PHYSICAL EXAMINATION AT DISCHARGE:  Visit Vitals  /84   Pulse 62   Temp 98.9 °F (37.2 °C)   Resp 14   Ht 5' 10\" (1.778 m)   Wt 77.6 kg (171 lb)   SpO2 94%   BMI 24.54 kg/m²     Gen: NAD, awake in bed  HEENT: NC/AT, sclera anicteric, PERRL, EOMI  CV: RRR no m/r/g  Resp: CTA b/l no increased work of breathing  Abd: NT/ND, normal bowel sounds  Ext: 2+ pulses, no edema, R arm bandaged.    Skin: No rashes      CHRONIC MEDICAL DIAGNOSES:  Problem List as of 3/4/2020 Date Reviewed: 3/4/2020          Codes Class Noted - Resolved    Tenosynovitis ICD-10-CM: M65.9  ICD-9-CM: 727.00  3/4/2020 - Present        Cellulitis ICD-10-CM: L03.90  ICD-9-CM: 682.9  2/28/2020 - Present        RESOLVED: Abscess of forearm ICD-10-CM: L02.419  ICD-9-CM: 682.3  3/11/2012 - 3/12/2012              Greater than 30 minutes were spent with the patient on counseling and coordination of care    Signed:   Lisa Padilla MD  3/4/2020  2:07 PM

## 2020-03-04 NOTE — PROGRESS NOTES
Leila Wellington (student nurse) was appropriately supervised during medication administration by preceptor. I assessed pt and reviewed Cheli's assessment.      Fuad Ashford

## 2020-03-04 NOTE — PROGRESS NOTES
Ortho Daily Progress Note      Patient: Pat Sotelo                   MRN: [id number]  Sex: male  YOB: 1965           Age: 53 y.o.    3 Days Post-Op    Procedure(s): INCISION AND DRAINAGE RIGHT HAND    Subjective: still sore but improving     Visit Vitals  /84   Pulse 62   Temp 98.9 °F (37.2 °C)   Resp 14   Ht 5' 10\" (1.778 m)   Wt 77.6 kg (171 lb)   SpO2 94%   BMI 24.54 kg/m²        Lab Results:  HGB   Date/Time Value Ref Range Status   03/04/2020 01:41 AM 13.2 12.1 - 17.0 g/dL Final     CULTURE, Su Hawkins [[id number]] (Order [id number])   Microbiology   Date: 3/1/2020 Department: Adri Castrejon [facility] [[email or web address]] Ortho Joint Released By:  (auto-released) Authorizing: Zac Orellana MD   Specimen Information: Misc. Wound        Component Value Flag Ref Range Units Status   Special Requests: RIGHT INDEX FINGER     Final   GRAM STAIN      Final   OCCASIONAL WBCS SEEN    GRAM STAIN NO ORGANISMS SEEN      Final   Culture result: Abnormal       Final   RARE STREPTOCOCCI, BETA HEMOLYTIC GROUP G Penicillin and ampicillin are drugs of choice for treatment of beta-hemolytic streptococcal infections. Susceptibility testing of penicillins and beta-lactams approved by the FDA for treatment of beta-hemolytic streptococcal infections need not be performed routinely, because nonsusceptible isolates are extremely rare.  CLSI 2012       Physical Exam:    DRESSING: dressing changed  SWELLING: mild  NEUROLOGICAL: intact  PULSE:yes   GENERAL: alert, cooperative, no distress, appears stated age  WOUND: Minimal drainage, erythema/edema much better  MOTION: index and thumb ROM improving  DVT Exam: No evidence of DVT seen on physical exam.      Plan:  Cultures growing Strep B  Cont iv abx  Ice and elevate  Home on po abx      ISSA Bynum  3/4/2020   10:50 AM      .

## 2020-03-04 NOTE — DISCHARGE INSTRUCTIONS
Dear Adolfo Dorsey,    Thank you for choosing [facility] for your healthcare needs. We strive to provide EXCELLENT care to you and your family. In an effort to explain clearly why you were here in the hospital, I've also written a very brief summary below. Other details including formal diagnosis, medication changes, and follow up appointment recommendations can also be found in this packet. You were admitted for *** due to *** for which you were started ***. You also received care from specialist physicians in the following specialties:  [facility] TO INFECTIOUS DISEASES    Here are the updates to your medication list:  ***    Remember that it is important for you to take your medications exactly as they are prescribed. It is helpful to keep a list of your medication with the names, dosages, and times to be taken in your wallet. Additionally, ***    Make sure to also see your primary care doctor for follow-up. Bring these papers with you and be sure to review your medication list with your doctor. I cannot stress the importance of follow up enough. I've included the information for your follow-up appointments below: Follow-up Information     Follow up With Specialties Details Why Contact Info    Belkys Cole MD [facility] In 1 week  [address] [phone]      Elena Larry MD Infectious Diseases In 1 month As previously scheduled  [address] [address] [address] [facility]      Sofie Barnett MD Orthopedic Surgery In 1 week  [address]  [phone]            At this time, the following test results are still pending: ***  Again, please follow-up these results with your primary care provider.     Should you have any fever over 101 degrees for 24 hours, chest pain, shortness of breath, fever, chills, nausea, vomiting, diarrhea, change in mentation, falling, weakness, bleeding, or worsening pain, please seek medical attention immediately. Finally, as your discharging physician, you may be receiving a survey regarding my care. I would greatly value and appreciate your input in the survey as we strive for excellence. If you have any questions, I can be reached at [phone].   Thank you so much again for allowing me to care for you at [address] [place] [address].    Respectfully yours,  So Santos MD

## 2022-04-01 ENCOUNTER — HOSPITAL ENCOUNTER (INPATIENT)
Age: 57
LOS: 3 days | Discharge: HOME HEALTH | DRG: 717 | End: 2022-04-05
Attending: EMERGENCY MEDICINE | Admitting: STUDENT IN AN ORGANIZED HEALTH CARE EDUCATION/TRAINING PROGRAM
Payer: COMMERCIAL

## 2022-04-01 ENCOUNTER — APPOINTMENT (OUTPATIENT)
Dept: ULTRASOUND IMAGING | Age: 57
DRG: 717 | End: 2022-04-01
Attending: EMERGENCY MEDICINE
Payer: COMMERCIAL

## 2022-04-01 DIAGNOSIS — A64 STI (SEXUALLY TRANSMITTED INFECTION): Primary | ICD-10-CM

## 2022-04-01 DIAGNOSIS — N49.2 CELLULITIS OF SCROTUM: ICD-10-CM

## 2022-04-01 DIAGNOSIS — N49.2 SCROTAL ABSCESS: ICD-10-CM

## 2022-04-01 LAB
ALBUMIN SERPL-MCNC: 4 G/DL (ref 3.5–5)
ALBUMIN/GLOB SERPL: 1.1 {RATIO} (ref 1.1–2.2)
ALP SERPL-CCNC: 94 U/L (ref 45–117)
ALT SERPL-CCNC: 13 U/L (ref 12–78)
ANION GAP SERPL CALC-SCNC: 5 MMOL/L (ref 5–15)
APPEARANCE UR: CLEAR
AST SERPL-CCNC: 5 U/L (ref 15–37)
BACTERIA URNS QL MICRO: NEGATIVE /HPF
BASOPHILS # BLD: 0 K/UL (ref 0–0.1)
BASOPHILS NFR BLD: 0 % (ref 0–1)
BILIRUB SERPL-MCNC: 1.2 MG/DL (ref 0.2–1)
BILIRUB UR QL: NEGATIVE
BUN SERPL-MCNC: 12 MG/DL (ref 6–20)
BUN/CREAT SERPL: 10 (ref 12–20)
CALCIUM SERPL-MCNC: 8.5 MG/DL (ref 8.5–10.1)
CHLORIDE SERPL-SCNC: 101 MMOL/L (ref 97–108)
CO2 SERPL-SCNC: 27 MMOL/L (ref 21–32)
COLOR UR: ABNORMAL
COMMENT, HOLDF: NORMAL
CREAT SERPL-MCNC: 1.23 MG/DL (ref 0.7–1.3)
DIFFERENTIAL METHOD BLD: ABNORMAL
EOSINOPHIL # BLD: 0.1 K/UL (ref 0–0.4)
EOSINOPHIL NFR BLD: 1 % (ref 0–7)
EPITH CASTS URNS QL MICRO: ABNORMAL /LPF
ERYTHROCYTE [DISTWIDTH] IN BLOOD BY AUTOMATED COUNT: 14.1 % (ref 11.5–14.5)
GLOBULIN SER CALC-MCNC: 3.5 G/DL (ref 2–4)
GLUCOSE SERPL-MCNC: 134 MG/DL (ref 65–100)
GLUCOSE UR STRIP.AUTO-MCNC: NEGATIVE MG/DL
HCT VFR BLD AUTO: 39.4 % (ref 36.6–50.3)
HGB BLD-MCNC: 13.1 G/DL (ref 12.1–17)
HGB UR QL STRIP: NEGATIVE
HYALINE CASTS URNS QL MICRO: ABNORMAL /LPF (ref 0–5)
IMM GRANULOCYTES # BLD AUTO: 0.2 K/UL (ref 0–0.04)
IMM GRANULOCYTES NFR BLD AUTO: 1 % (ref 0–0.5)
KETONES UR QL STRIP.AUTO: NEGATIVE MG/DL
LEUKOCYTE ESTERASE UR QL STRIP.AUTO: NEGATIVE
LYMPHOCYTES # BLD: 1.7 K/UL (ref 0.8–3.5)
LYMPHOCYTES NFR BLD: 13 % (ref 12–49)
MCH RBC QN AUTO: 31 PG (ref 26–34)
MCHC RBC AUTO-ENTMCNC: 33.2 G/DL (ref 30–36.5)
MCV RBC AUTO: 93.4 FL (ref 80–99)
MONOCYTES # BLD: 0.7 K/UL (ref 0–1)
MONOCYTES NFR BLD: 6 % (ref 5–13)
NEUTS SEG # BLD: 10.3 K/UL (ref 1.8–8)
NEUTS SEG NFR BLD: 79 % (ref 32–75)
NITRITE UR QL STRIP.AUTO: NEGATIVE
NRBC # BLD: 0 K/UL (ref 0–0.01)
NRBC BLD-RTO: 0 PER 100 WBC
PH UR STRIP: 6 [PH] (ref 5–8)
PLATELET # BLD AUTO: 164 K/UL (ref 150–400)
PMV BLD AUTO: 9 FL (ref 8.9–12.9)
POTASSIUM SERPL-SCNC: 3.6 MMOL/L (ref 3.5–5.1)
PROT SERPL-MCNC: 7.5 G/DL (ref 6.4–8.2)
PROT UR STRIP-MCNC: 30 MG/DL
RBC # BLD AUTO: 4.22 M/UL (ref 4.1–5.7)
RBC #/AREA URNS HPF: ABNORMAL /HPF (ref 0–5)
SAMPLES BEING HELD,HOLD: NORMAL
SODIUM SERPL-SCNC: 133 MMOL/L (ref 136–145)
SP GR UR REFRACTOMETRY: 1.03 (ref 1–1.03)
UR CULT HOLD, URHOLD: NORMAL
UROBILINOGEN UR QL STRIP.AUTO: 2 EU/DL (ref 0.2–1)
WBC # BLD AUTO: 13.1 K/UL (ref 4.1–11.1)
WBC URNS QL MICRO: ABNORMAL /HPF (ref 0–4)

## 2022-04-01 PROCEDURE — 85025 COMPLETE CBC W/AUTO DIFF WBC: CPT

## 2022-04-01 PROCEDURE — 87186 SC STD MICRODIL/AGAR DIL: CPT

## 2022-04-01 PROCEDURE — 83605 ASSAY OF LACTIC ACID: CPT

## 2022-04-01 PROCEDURE — 87086 URINE CULTURE/COLONY COUNT: CPT

## 2022-04-01 PROCEDURE — 87077 CULTURE AEROBIC IDENTIFY: CPT

## 2022-04-01 PROCEDURE — 80053 COMPREHEN METABOLIC PANEL: CPT

## 2022-04-01 PROCEDURE — 74011250636 HC RX REV CODE- 250/636: Performed by: EMERGENCY MEDICINE

## 2022-04-01 PROCEDURE — 96376 TX/PRO/DX INJ SAME DRUG ADON: CPT

## 2022-04-01 PROCEDURE — 76870 US EXAM SCROTUM: CPT

## 2022-04-01 PROCEDURE — 36415 COLL VENOUS BLD VENIPUNCTURE: CPT

## 2022-04-01 PROCEDURE — 96375 TX/PRO/DX INJ NEW DRUG ADDON: CPT

## 2022-04-01 PROCEDURE — 99285 EMERGENCY DEPT VISIT HI MDM: CPT

## 2022-04-01 PROCEDURE — 96374 THER/PROPH/DIAG INJ IV PUSH: CPT

## 2022-04-01 PROCEDURE — 81001 URINALYSIS AUTO W/SCOPE: CPT

## 2022-04-01 PROCEDURE — 74011250637 HC RX REV CODE- 250/637: Performed by: EMERGENCY MEDICINE

## 2022-04-01 RX ORDER — ONDANSETRON 2 MG/ML
4 INJECTION INTRAMUSCULAR; INTRAVENOUS
Status: COMPLETED | OUTPATIENT
Start: 2022-04-01 | End: 2022-04-01

## 2022-04-01 RX ORDER — CLINDAMYCIN HYDROCHLORIDE 150 MG/1
300 CAPSULE ORAL
Status: COMPLETED | OUTPATIENT
Start: 2022-04-01 | End: 2022-04-01

## 2022-04-01 RX ORDER — FENTANYL CITRATE 50 UG/ML
50 INJECTION, SOLUTION INTRAMUSCULAR; INTRAVENOUS
Status: COMPLETED | OUTPATIENT
Start: 2022-04-01 | End: 2022-04-01

## 2022-04-01 RX ORDER — DOXYCYCLINE HYCLATE 100 MG
100 TABLET ORAL
Status: COMPLETED | OUTPATIENT
Start: 2022-04-01 | End: 2022-04-02

## 2022-04-01 RX ORDER — KETOROLAC TROMETHAMINE 30 MG/ML
15 INJECTION, SOLUTION INTRAMUSCULAR; INTRAVENOUS
Status: COMPLETED | OUTPATIENT
Start: 2022-04-01 | End: 2022-04-01

## 2022-04-01 RX ORDER — VANCOMYCIN 2 GRAM/500 ML IN 0.9 % SODIUM CHLORIDE INTRAVENOUS
2000 ONCE
Status: COMPLETED | OUTPATIENT
Start: 2022-04-02 | End: 2022-04-02

## 2022-04-01 RX ADMIN — FENTANYL CITRATE 50 MCG: 50 INJECTION, SOLUTION INTRAMUSCULAR; INTRAVENOUS at 22:26

## 2022-04-01 RX ADMIN — KETOROLAC TROMETHAMINE 15 MG: 30 INJECTION, SOLUTION INTRAMUSCULAR; INTRAVENOUS at 22:26

## 2022-04-01 RX ADMIN — SODIUM CHLORIDE 1000 ML: 9 INJECTION, SOLUTION INTRAVENOUS at 22:26

## 2022-04-01 RX ADMIN — ONDANSETRON HYDROCHLORIDE 4 MG: 2 SOLUTION INTRAMUSCULAR; INTRAVENOUS at 22:27

## 2022-04-01 RX ADMIN — CLINDAMYCIN HYDROCHLORIDE 300 MG: 150 CAPSULE ORAL at 22:27

## 2022-04-01 NOTE — ED TRIAGE NOTES
Patient reports testicular pain x3 days, swollen \"like a grapefruit\". Has made appointment with urologist but its not until next week. Patient reports having visual hallucinations when laying down for bed, is concerned this is related to his new HIV medication. Patient is also concerned he has a UTI- has urgency and cloudy urine. Denies burning or discharge.

## 2022-04-02 PROBLEM — N45.3 ORCHITIS AND EPIDIDYMITIS: Status: ACTIVE | Noted: 2022-04-02

## 2022-04-02 LAB — LACTATE SERPL-SCNC: 0.7 MMOL/L (ref 0.4–2)

## 2022-04-02 PROCEDURE — 87186 SC STD MICRODIL/AGAR DIL: CPT

## 2022-04-02 PROCEDURE — 0V950ZZ DRAINAGE OF SCROTUM, OPEN APPROACH: ICD-10-PCS | Performed by: UROLOGY

## 2022-04-02 PROCEDURE — 87147 CULTURE TYPE IMMUNOLOGIC: CPT

## 2022-04-02 PROCEDURE — 74011250637 HC RX REV CODE- 250/637: Performed by: STUDENT IN AN ORGANIZED HEALTH CARE EDUCATION/TRAINING PROGRAM

## 2022-04-02 PROCEDURE — 74011250637 HC RX REV CODE- 250/637: Performed by: INTERNAL MEDICINE

## 2022-04-02 PROCEDURE — 87040 BLOOD CULTURE FOR BACTERIA: CPT

## 2022-04-02 PROCEDURE — 87077 CULTURE AEROBIC IDENTIFY: CPT

## 2022-04-02 PROCEDURE — 65210000002 HC RM PRIVATE GYN

## 2022-04-02 PROCEDURE — 74011250636 HC RX REV CODE- 250/636: Performed by: INTERNAL MEDICINE

## 2022-04-02 PROCEDURE — 74011250636 HC RX REV CODE- 250/636: Performed by: UROLOGY

## 2022-04-02 PROCEDURE — 74011000258 HC RX REV CODE- 258: Performed by: EMERGENCY MEDICINE

## 2022-04-02 PROCEDURE — 87491 CHLMYD TRACH DNA AMP PROBE: CPT

## 2022-04-02 PROCEDURE — 87205 SMEAR GRAM STAIN: CPT

## 2022-04-02 PROCEDURE — 74011250636 HC RX REV CODE- 250/636: Performed by: STUDENT IN AN ORGANIZED HEALTH CARE EDUCATION/TRAINING PROGRAM

## 2022-04-02 PROCEDURE — 74011250636 HC RX REV CODE- 250/636: Performed by: EMERGENCY MEDICINE

## 2022-04-02 PROCEDURE — 74011000250 HC RX REV CODE- 250: Performed by: UROLOGY

## 2022-04-02 PROCEDURE — 36415 COLL VENOUS BLD VENIPUNCTURE: CPT

## 2022-04-02 PROCEDURE — 74011000258 HC RX REV CODE- 258: Performed by: STUDENT IN AN ORGANIZED HEALTH CARE EDUCATION/TRAINING PROGRAM

## 2022-04-02 PROCEDURE — 96365 THER/PROPH/DIAG IV INF INIT: CPT

## 2022-04-02 PROCEDURE — 74011250637 HC RX REV CODE- 250/637: Performed by: EMERGENCY MEDICINE

## 2022-04-02 RX ORDER — VANCOMYCIN/0.9 % SOD CHLORIDE 1.5G/250ML
1500 PLASTIC BAG, INJECTION (ML) INTRAVENOUS EVERY 24 HOURS
Status: DISCONTINUED | OUTPATIENT
Start: 2022-04-03 | End: 2022-04-03

## 2022-04-02 RX ORDER — CETIRIZINE HCL 10 MG
10 TABLET ORAL
Status: DISCONTINUED | OUTPATIENT
Start: 2022-04-02 | End: 2022-04-05 | Stop reason: HOSPADM

## 2022-04-02 RX ORDER — LORAZEPAM 0.5 MG/1
0.5 TABLET ORAL 2 TIMES DAILY
Status: DISCONTINUED | OUTPATIENT
Start: 2022-04-02 | End: 2022-04-05 | Stop reason: HOSPADM

## 2022-04-02 RX ORDER — SODIUM BICARBONATE 84 MG/ML
50 INJECTION, SOLUTION INTRAVENOUS ONCE
Status: COMPLETED | OUTPATIENT
Start: 2022-04-02 | End: 2022-04-02

## 2022-04-02 RX ORDER — SERTRALINE HYDROCHLORIDE 50 MG/1
200 TABLET, FILM COATED ORAL DAILY
Status: DISCONTINUED | OUTPATIENT
Start: 2022-04-03 | End: 2022-04-05 | Stop reason: HOSPADM

## 2022-04-02 RX ORDER — EMTRICITABINE AND TENOFOVIR DISOPROXIL FUMARATE 200; 300 MG/1; MG/1
1 TABLET, FILM COATED ORAL EVERY EVENING
Status: DISCONTINUED | OUTPATIENT
Start: 2022-04-02 | End: 2022-04-02

## 2022-04-02 RX ORDER — CLINDAMYCIN PHOSPHATE 900 MG/50ML
900 INJECTION INTRAVENOUS EVERY 8 HOURS
Status: DISCONTINUED | OUTPATIENT
Start: 2022-04-02 | End: 2022-04-03

## 2022-04-02 RX ORDER — LORAZEPAM 2 MG/ML
1 INJECTION INTRAMUSCULAR ONCE
Status: COMPLETED | OUTPATIENT
Start: 2022-04-02 | End: 2022-04-02

## 2022-04-02 RX ORDER — TRAMADOL HYDROCHLORIDE 50 MG/1
50 TABLET ORAL
Status: DISCONTINUED | OUTPATIENT
Start: 2022-04-02 | End: 2022-04-05 | Stop reason: HOSPADM

## 2022-04-02 RX ORDER — ZOLPIDEM TARTRATE 5 MG/1
10 TABLET ORAL
Status: DISCONTINUED | OUTPATIENT
Start: 2022-04-02 | End: 2022-04-05 | Stop reason: HOSPADM

## 2022-04-02 RX ORDER — ENOXAPARIN SODIUM 100 MG/ML
40 INJECTION SUBCUTANEOUS EVERY 24 HOURS
Status: DISCONTINUED | OUTPATIENT
Start: 2022-04-02 | End: 2022-04-05 | Stop reason: HOSPADM

## 2022-04-02 RX ORDER — FENTANYL CITRATE 50 UG/ML
50 INJECTION, SOLUTION INTRAMUSCULAR; INTRAVENOUS ONCE
Status: COMPLETED | OUTPATIENT
Start: 2022-04-02 | End: 2022-04-02

## 2022-04-02 RX ORDER — LIDOCAINE HYDROCHLORIDE 20 MG/ML
0.3 INJECTION, SOLUTION INFILTRATION; PERINEURAL ONCE
Status: COMPLETED | OUTPATIENT
Start: 2022-04-02 | End: 2022-04-02

## 2022-04-02 RX ADMIN — ZOLPIDEM TARTRATE 10 MG: 5 TABLET ORAL at 21:09

## 2022-04-02 RX ADMIN — CLINDAMYCIN PHOSPHATE 900 MG: 900 INJECTION, SOLUTION INTRAVENOUS at 14:00

## 2022-04-02 RX ADMIN — CETIRIZINE HYDROCHLORIDE 10 MG: 10 TABLET, FILM COATED ORAL at 21:10

## 2022-04-02 RX ADMIN — SODIUM BICARBONATE 1 MEQ: 84 INJECTION, SOLUTION INTRAVENOUS at 01:59

## 2022-04-02 RX ADMIN — PIPERACILLIN SODIUM AND TAZOBACTAM SODIUM 3.38 G: 3; 375 INJECTION, POWDER, FOR SOLUTION INTRAVENOUS at 00:33

## 2022-04-02 RX ADMIN — DOXYCYCLINE HYCLATE 100 MG: 100 TABLET, COATED ORAL at 02:03

## 2022-04-02 RX ADMIN — PIPERACILLIN SODIUM AND TAZOBACTAM SODIUM 3.38 G: 3; 375 INJECTION, POWDER, FOR SOLUTION INTRAVENOUS at 07:29

## 2022-04-02 RX ADMIN — LORAZEPAM 1 MG: 2 INJECTION INTRAMUSCULAR; INTRAVENOUS at 00:32

## 2022-04-02 RX ADMIN — LORAZEPAM 0.5 MG: 1 TABLET ORAL at 17:32

## 2022-04-02 RX ADMIN — PIPERACILLIN SODIUM AND TAZOBACTAM SODIUM 3.38 G: 3; 375 INJECTION, POWDER, FOR SOLUTION INTRAVENOUS at 23:39

## 2022-04-02 RX ADMIN — TRAMADOL HYDROCHLORIDE 50 MG: 50 TABLET, COATED ORAL at 20:09

## 2022-04-02 RX ADMIN — CLINDAMYCIN PHOSPHATE 900 MG: 900 INJECTION, SOLUTION INTRAVENOUS at 06:50

## 2022-04-02 RX ADMIN — ENOXAPARIN SODIUM 40 MG: 100 INJECTION SUBCUTANEOUS at 09:57

## 2022-04-02 RX ADMIN — VANCOMYCIN HYDROCHLORIDE 2000 MG: 500 INJECTION, POWDER, LYOPHILIZED, FOR SOLUTION INTRAVENOUS at 02:00

## 2022-04-02 RX ADMIN — LIDOCAINE HYDROCHLORIDE 6 MG: 20 INJECTION, SOLUTION INFILTRATION; PERINEURAL at 00:30

## 2022-04-02 RX ADMIN — TRAMADOL HYDROCHLORIDE 50 MG: 50 TABLET, COATED ORAL at 09:25

## 2022-04-02 RX ADMIN — FENTANYL CITRATE 50 MCG: 50 INJECTION, SOLUTION INTRAMUSCULAR; INTRAVENOUS at 00:31

## 2022-04-02 RX ADMIN — PIPERACILLIN SODIUM AND TAZOBACTAM SODIUM 3.38 G: 3; 375 INJECTION, POWDER, FOR SOLUTION INTRAVENOUS at 15:14

## 2022-04-02 RX ADMIN — CLINDAMYCIN PHOSPHATE 900 MG: 900 INJECTION, SOLUTION INTRAVENOUS at 22:25

## 2022-04-02 RX ADMIN — BICTEGRAVIR SODIUM, EMTRICITABINE, AND TENOFOVIR ALAFENAMIDE FUMARATE 1 TABLET: 50; 200; 25 TABLET ORAL at 17:32

## 2022-04-02 NOTE — CONSULTS
Consult Date: 4/2/2022    Consults  CTSP for scrotal cellulitis. He has had a pimple on the scrotum for a few weeks. Over the last 3 days it has gotten worse. There is now severe redness and tenderness on the left side of the scrotum. US shows jayden epididymitis and left orchitis. There is thickened scrotal skin. On exam, there appears to be a small fluctuant area over the inferior left scrotum. The skin is very red. Redness extends up to the suprapubic area. There is no crepitus. Impression  Small abscess with assoc. Cellulitis - fourniers less likely given the lack of crepitus. rec  I and D- can do this at bedside now. Discussed the procedure with him - he understands this may help reverse the infection- he may need more formal debridement if this does not help    Agree with broad spectrum abx.     Subjective     Past Medical History:   Diagnosis Date    Hepatitis C     HIV infection ([facility] [address]     Psychiatric disorder     depression      Past Surgical History:   Procedure Laterality Date    HX ADENOIDECTOMY       Family History   Problem Relation Age of Onset    Hypertension Unknown       Social History     Tobacco Use    Smoking status: Never Smoker    Smokeless tobacco: Not on file   Substance Use Topics    Alcohol use: Yes       Current Facility-Administered Medications   Medication Dose Route Frequency Provider Last Rate Last Admin    lidocaine-EPINEPHrine (PF) (XYLOCAINE) 1 %-1:200,000 injection 45 mg  4.5 mL SubCUTAneous ONCE Brenna Carolina MD        sodium bicarbonate 8.4 % (1 mEq/mL) injection 50 mEq  50 mEq IntraVENous ONCE Brenna Carolina MD        fentaNYL citrate (PF) injection 50 mcg  50 mcg IntraVENous ONCE Brenna Carolina MD        LORazepam (ATIVAN) injection 1 mg  1 mg IntraVENous Lefty Jacobsen MD        sodium chloride 0.9 % bolus infusion 1,000 mL  1,000 mL IntraVENous NOW Daniel Farooq  mL/hr at 04/01/22 2226 1,000 mL at 04/01/22 2226    cefTRIAXone (ROCEPHIN) 500 mg in lidocaine (PF) (XYLOCAINE) 10 mg/mL (1 %) IM injection  500 mg IntraMUSCular NOW Jarrett Farooq MD        doxycycline (VIBRA-TABS) tablet 100 mg  100 mg Oral NOW Jarrett Farooq MD        vancomycin (VANCOCIN) 2000 mg in  ml infusion  2,000 mg IntraVENous ONCE Jarrett Farooq MD        piperacillin-tazobactam (ZOSYN) 3.375 g in 0.9% sodium chloride (MBP/ADV) 100 mL MBP  3.375 g IntraVENous NOW Jarertt Farooq MD         Current Outpatient Medications   Medication Sig Dispense Refill    sertraline (ZOLOFT) 100 mg tablet Take 200 mg by mouth daily.  LORazepam (ATIVAN) 0.5 mg tablet Take 0.5 mg by mouth two (2) times a day.  dextroamphetamine-amphetamine (ADDERALL) 20 mg tablet Take 20 mg by mouth daily.  dextroamphetamine-amphetamine (ADDERALL) 20 mg tablet Take 20 mg by mouth daily as needed. Takes in the evening if necessary      cetirizine (ZYRTEC) 10 mg tablet Take 10 mg by mouth nightly.  zolpidem (AMBIEN) 10 mg tablet Take 10 mg by mouth nightly.  dolutegravir (TIVICAY) 50 mg tab tablet Take 50 mg by mouth every evening.  emtricitabine-tenofovir, TDF, (TRUVADA) 200-300 mg per tablet Take 1 Tab by mouth every evening. Review of Systems   Constitutional: Positive for fever. Negative for activity change and appetite change. HENT: Negative for congestion and dental problem. Eyes: Negative for discharge and itching. Respiratory: Negative for apnea and chest tightness. Cardiovascular: Negative for chest pain and leg swelling. Gastrointestinal: Negative for abdominal distention and abdominal pain. Endocrine: Negative for cold intolerance and heat intolerance. Genitourinary: Positive for scrotal swelling and testicular pain. Musculoskeletal: Negative for arthralgias and back pain. Allergic/Immunologic: Negative for environmental allergies and food allergies. Neurological: Negative for dizziness and facial asymmetry. Hematological: Negative for adenopathy. Does not bruise/bleed easily. Psychiatric/Behavioral: Negative for agitation and behavioral problems. Objective     Vital signs for last 24 hours:  Visit Vitals  /72 (BP 1 Location: Right arm, BP Patient Position: At rest;Supine)   Pulse 87   Temp 99.3 °F (37.4 °C)   Resp 17   Ht 5' 10\" (1.778 m)   Wt 83.5 kg (184 lb)   SpO2 100%   BMI 26.40 kg/m²       Intake/Output this shift:  Current Shift: No intake/output data recorded. Last 3 Shifts: No intake/output data recorded. Data Review:   Recent Results (from the past 24 hour(s))   CBC WITH AUTOMATED DIFF    Collection Time: 04/01/22  8:06 PM   Result Value Ref Range    WBC 13.1 (H) 4.1 - 11.1 K/uL    RBC 4.22 4.10 - 5.70 M/uL    HGB 13.1 12.1 - 17.0 g/dL    HCT 39.4 36.6 - 50.3 %    MCV 93.4 80.0 - 99.0 FL    MCH 31.0 26.0 - 34.0 PG    MCHC 33.2 30.0 - 36.5 g/dL    RDW 14.1 11.5 - 14.5 %    PLATELET 285 689 - 615 K/uL    MPV 9.0 8.9 - 12.9 FL    NRBC 0.0 0  WBC    ABSOLUTE NRBC 0.00 0.00 - 0.01 K/uL    NEUTROPHILS 79 (H) 32 - 75 %    LYMPHOCYTES 13 12 - 49 %    MONOCYTES 6 5 - 13 %    EOSINOPHILS 1 0 - 7 %    BASOPHILS 0 0 - 1 %    IMMATURE GRANULOCYTES 1 (H) 0.0 - 0.5 %    ABS. NEUTROPHILS 10.3 (H) 1.8 - 8.0 K/UL    ABS. LYMPHOCYTES 1.7 0.8 - 3.5 K/UL    ABS. MONOCYTES 0.7 0.0 - 1.0 K/UL    ABS. EOSINOPHILS 0.1 0.0 - 0.4 K/UL    ABS. BASOPHILS 0.0 0.0 - 0.1 K/UL    ABS. IMM.  GRANS. 0.2 (H) 0.00 - 0.04 K/UL    DF AUTOMATED     METABOLIC PANEL, COMPREHENSIVE    Collection Time: 04/01/22  8:06 PM   Result Value Ref Range    Sodium 133 (L) 136 - 145 mmol/L    Potassium 3.6 3.5 - 5.1 mmol/L    Chloride 101 97 - 108 mmol/L    CO2 27 21 - 32 mmol/L    Anion gap 5 5 - 15 mmol/L    Glucose 134 (H) 65 - 100 mg/dL    BUN 12 6 - 20 MG/DL    Creatinine 1.23 0.70 - 1.30 MG/DL    BUN/Creatinine ratio 10 (L) 12 - 20      GFR est AA >60 >60 ml/min/1.73m2    GFR est non-AA >60 >60 ml/min/1.73m2    Calcium 8.5 8.5 - 10.1 MG/DL    Bilirubin, total 1.2 (H) 0.2 - 1.0 MG/DL    ALT (SGPT) 13 12 - 78 U/L    AST (SGOT) 5 (L) 15 - 37 U/L    Alk. phosphatase 94 45 - 117 U/L    Protein, total 7.5 6.4 - 8.2 g/dL    Albumin 4.0 3.5 - 5.0 g/dL    Globulin 3.5 2.0 - 4.0 g/dL    A-G Ratio 1.1 1.1 - 2.2     SAMPLES BEING HELD    Collection Time: 04/01/22  8:06 PM   Result Value Ref Range    SAMPLES BEING HELD 1BLU, 1RED     COMMENT        Add-on orders for these samples will be processed based on acceptable specimen integrity and analyte stability, which may vary by analyte. URINALYSIS W/MICROSCOPIC    Collection Time: 04/01/22 10:48 PM   Result Value Ref Range    Color YELLOW/STRAW      Appearance CLEAR CLEAR      Specific gravity 1.028 1.003 - 1.030      pH (UA) 6.0 5.0 - 8.0      Protein 30 (A) NEG mg/dL    Glucose Negative NEG mg/dL    Ketone Negative NEG mg/dL    Bilirubin Negative NEG      Blood Negative NEG      Urobilinogen 2.0 (H) 0.2 - 1.0 EU/dL    Nitrites Negative NEG      Leukocyte Esterase Negative NEG      WBC 0-4 0 - 4 /hpf    RBC 0-5 0 - 5 /hpf    Epithelial cells FEW FEW /lpf    Bacteria Negative NEG /hpf    Hyaline cast 0-2 0 - 5 /lpf   URINE CULTURE HOLD SAMPLE    Collection Time: 04/01/22 10:48 PM    Specimen: Serum; Urine   Result Value Ref Range    Urine culture hold        Urine on hold in Microbiology dept for 2 days. If unpreserved urine is submitted, it cannot be used for addtional testing after 24 hours, recollection will be required. Physical Exam  Constitutional:       General: He is not in acute distress. Appearance: He is not ill-appearing or diaphoretic. HENT:      Head: Normocephalic and atraumatic. Right Ear: External ear normal.      Left Ear: External ear normal.      Nose: Nose normal. No congestion. Mouth/Throat:      Mouth: Mucous membranes are dry. Pharynx: Oropharynx is clear. Eyes:      General:         Right eye: No discharge. Left eye: No discharge. Cardiovascular:      Rate and Rhythm: Normal rate and regular rhythm. Heart sounds: No murmur heard. Pulmonary:      Effort: Pulmonary effort is normal. No respiratory distress. Abdominal:      General: There is no distension. Palpations: There is no mass. Genitourinary:         Comments: See HPI   Musculoskeletal:      Cervical back: Normal range of motion. No tenderness. Neurological:      Mental Status: He is oriented to person, place, and time. Cranial Nerves: No cranial nerve deficit.    Psychiatric:         Mood and Affect: Mood normal.         Behavior: Behavior normal.

## 2022-04-02 NOTE — PROGRESS NOTES
Wound Progress Note      Patient: Lupe Loo               Sex: male          DOA: 4/1/2022       YOB: 1965      Age:  61 y.o.        LOS:  LOS: 0 days              Lupe Loo is a 61y.o. year old man who presents with a wound on the left scrotum following I and D early this AM.  He feels a bit less pain. Scrotum still feels heavy     Subjective Exam:  Past Medical History:   Diagnosis Date    Hepatitis C     HIV infection ([facility] [address]     Psychiatric disorder     depression     Past Surgical History:   Procedure Laterality Date    HX ADENOIDECTOMY       Social History     Socioeconomic History    Marital status: SINGLE     Spouse name: Not on file    Number of children: Not on file    Years of education: Not on file    Highest education level: Not on file   Occupational History    Not on file   Tobacco Use    Smoking status: Never Smoker    Smokeless tobacco: Not on file   Substance and Sexual Activity    Alcohol use: Yes    Drug use: No    Sexual activity: Not on file   Other Topics Concern    Not on file   Social History Narrative    Not on file     Social Determinants of Health     Financial Resource Strain:     Difficulty of Paying Living Expenses: Not on file   Food Insecurity:     Worried About Running Out of Food in the Last Year: Not on file    Nilesh of Food in the Last Year: Not on file   Transportation Needs:     Lack of Transportation (Medical): Not on file    Lack of Transportation (Non-Medical):  Not on file   Physical Activity:     Days of Exercise per Week: Not on file    Minutes of Exercise per Session: Not on file   Stress:     Feeling of Stress : Not on file   Social Connections:     Frequency of Communication with Friends and Family: Not on file    Frequency of Social Gatherings with Friends and Family: Not on file    Attends Taoist Services: Not on file    Active Member of Clubs or Organizations: Not on file    Attends Club or Organization Meetings: Not on file    Marital Status: Not on file   Intimate Partner Violence:     Fear of Current or Ex-Partner: Not on file    Emotionally Abused: Not on file    Physically Abused: Not on file    Sexually Abused: Not on file   Housing Stability:     Unable to Pay for Housing in the Last Year: Not on file    Number of [place] in the Last Year: Not on file    Unstable Housing in the Last Year: Not on file     Family History   Problem Relation Age of Onset    Hypertension Unknown        ROS        Visit Vitals  /73 (BP 1 Location: Right upper arm, BP Patient Position: At rest)   Pulse 83   Temp 98.6 °F (37 °C)   Resp 16   Ht 5' 10\" (1.778 m)   Wt 85.3 kg (188 lb 0.8 oz)   SpO2 98%   BMI 26.98 kg/m²       Exam    :Objective:  Still pink SP area  No crepitus  Less tender  No drainage    Plan:  BID dressing changes  Consult wound care  Cont broad spectrum abx   I will see in the AM tomorrow.    Dontae Mora MD  April 2, 2022  11:08 AM

## 2022-04-02 NOTE — PROGRESS NOTES
Day #1 of Clindamycin  Indication:  Chase's gangrene  Current regimen:  Rx to dose  Abx regimen: Vancomycin, Zosyn and Clindamycin  Recent Labs     22   WBC 13.1*   CREA 1.23   BUN 12     Est CrCl: 68 ml/min  Temp (24hrs), Av.5 °F (36.9 °C), Min:97.7 °F (36.5 °C), Max:99.3 °F (37.4 °C)    Cultures: wound, blood and urine    Plan: Change to 900 mg q8h

## 2022-04-02 NOTE — PROGRESS NOTES
Bedside and Verbal shift change report given to NUZHAT Hernandez (oncoming nurse) by Torsten Hampton (offgoing nurse). Report included the following information SBAR, Kardex, ED Summary, Procedure Summary, Intake/Output, MAR and Recent Results.

## 2022-04-02 NOTE — ED NOTES
[id number]- Assisted Dr Shorty Díaz @ bedside for I&D per request.  Wound cx has been collected and sent to lab

## 2022-04-02 NOTE — BSMART NOTE
This writer met with patient face to face. Pt did not want to meet with mental health because he is here for testicular pain. Pt denied SI/HI. Pt reports VH at night but stated he read online that this is a side effect from a new HIV medication he is taking. Pt denied [facility] currently. He sees an outpatient psychiatrist Dr. Tristan Gaffney at [address] [place] [address] for depression and anxiety, and has an appointment on Tuesday 4/5/22. Pt denied any additional concerns or questions. Pt to discharge home pending medical clearance. ER provide, Dr. Ezekiel Martínez, is aware and in agreement of disposition.      LIZ Arauz, Supervisee in Social Work

## 2022-04-02 NOTE — H&P
PLEASE NOTE: I HAVE GENERATED THIS NOTE WITH THE ASSISTANCE OF VOICE-RECOGNITION TECHNOLOGY. PLEASE EXCUSE ANY SPELLING, GRAMMATICAL, AND SYNTAX ERRORS YOU MAY FIND. IF YOU NEED CLARIFICATION ON ANYTHING, PLEASE FEEL FREE TO REACH OUT TO ME.  [address] [facility],[address] [facility] Adult  Hospitalist Group  History and Physical - Dr. Joe Hartley    Primary Care Provider: Jalen Ellsworth MD  Date of Service:  See Date Note Was Originated    Chief Complaint: Scrotal pain    Subjective:     61 y.o. male presents with 3 days duration of abrupt onset, gradually worsening, severe, constant, nonradiating, sharp, scrotal pain that is associated with subjective fevers. Patient denies exacerbating features  and denies remitting features. Review of Systems:  12 point ROS obtained and otherwise negative, except as per HPI and above. Past Medical History:   Diagnosis Date    Hepatitis C     HIV infection ([facility] [address]     Psychiatric disorder     depression      Past Surgical History:   Procedure Laterality Date    HX ADENOIDECTOMY       Prior to Admission medications    Medication Sig Start Date End Date Taking? Authorizing Provider   sertraline (ZOLOFT) 100 mg tablet Take 200 mg by mouth daily. Provider, Historical   LORazepam (ATIVAN) 0.5 mg tablet Take 0.5 mg by mouth two (2) times a day. Provider, Historical   dextroamphetamine-amphetamine (ADDERALL) 20 mg tablet Take 20 mg by mouth daily. Provider, Historical   dextroamphetamine-amphetamine (ADDERALL) 20 mg tablet Take 20 mg by mouth daily as needed. Takes in the evening if necessary    Provider, Historical   cetirizine (ZYRTEC) 10 mg tablet Take 10 mg by mouth nightly. Provider, Historical   zolpidem (AMBIEN) 10 mg tablet Take 10 mg by mouth nightly. Provider, Historical   dolutegravir (TIVICAY) 50 mg tab tablet Take 50 mg by mouth every evening.     Provider, Historical   emtricitabine-tenofovir, TDF, (TRUVADA) 200-300 mg per tablet Take 1 Tab by mouth every evening. Provider, Historical     Allergies   Allergen Reactions    Sulfa (Sulfonamide Antibiotics) Hives     Pt states he got hives as a child      Family History   Problem Relation Age of Onset    Hypertension Unknown    . Social History     Socioeconomic History    Marital status: SINGLE   Tobacco Use    Smoking status: Never Smoker   Substance and Sexual Activity    Alcohol use: Yes    Drug use: No   .    Objective:     Physical Exam:     VS as below    Const'l:          Normal body habitus, a&o, no acute distress  Head/Neck:       neck supple, no jvd, trachea midline, carotid midline, no cervical/head mass  Eyes:     joellen, nonicteric sclera, eom intact  ENT:      auditory acuity grossly intact, no nasal deformity  Cardio:           Regular rate regular rhythm, no murmurs/rubs/gallops, no carotid bruit, normal s1, s2  Pulm:     no accessory muscle use, equal normal breath sounds bilaterally, clear tab  Abd:       S NT ND normal bowel sounds x 4 quadrants, no palpable masses  Derm:     no rashes, no ulcers, no lesions  Extr:      No edema, no cyanosis, no calf tenderness, no varicosities  Neuro:    cn II-XII grossly intact, muscle strength intact, sensation intact  Psych:   mood intact, judgement intact   exam: Deferred, patient already had the ED doc, urologist and multiple nurses look at his scrotum, did not want me to look at it    Data Review: All diagnostic labs and studies have been reviewed. .    Assessment:     Active Problems:    Cellulitis (2/28/2020)      Tenosynovitis (3/4/2020)      Orchitis and epididymitis (4/2/2022)        Plan:     -Urology is on consult, has already come through and done an I&D on the patient  -Continue home HIV medications  -Pain control  -Antibiotics with Zosyn, clindamycin, vancomycin, just in case of 40 years gangrene      Code status was discussed with the patient.   Code status entered as per the orders  Signed By: Susette Lanes Tali Pop

## 2022-04-02 NOTE — PROGRESS NOTES
[address] [facility] Adult  Hospitalist Group                                                                                          Hospitalist Progress Note  Davon Olguin MD  Answering service: [phone] OR [phone] from in house phone        Date of Service:  2022  NAME:  Jose Garcia  :  1965  MRN:  [id number]      Admission Summary:   Copied form admit: \" Chief Complaint: Scrotal pain     Subjective:      61 y.o. male presents with 3 days duration of abrupt onset, gradually worsening, severe, constant, nonradiating, sharp, scrotal pain that is associated with subjective fevers. Patient denies exacerbating features  and denies remitting features. \"    Interval history / Subjective:     2022 :    Swelling slows down   Pain controled   No n/v   Else as below        Assessment & Plan:      Orchitis and epididymitis (2022)           Plan:      -Urology is on consult, has already come through and done an I&D on the patient  -Continue home HIV medications  -Pain control  -Antibiotics with Zosyn, clindamycin, vancomycin, just in case of 40 years gangrene   - cont daily monitoring lab and exam        Code status: Full   DVT prophylaxis: [facility] Problems  Date Reviewed: 3/4/2020          Codes Class Noted POA    Orchitis and epididymitis ICD-10-CM: N45.3  ICD-9-CM: 604.90  2022 Unknown        Tenosynovitis ICD-10-CM: M65.9  ICD-9-CM: 727.00  3/4/2020 Yes        Cellulitis ICD-10-CM: L03.90  ICD-9-CM: 682.9  2020 Yes                  After personally interviewing pt at bedside the following is noted on 2022 :    Review of Systems   Constitutional: Negative for chills and fever. Respiratory: Negative for shortness of breath. Cardiovascular: Negative for chest pain. Gastrointestinal: Negative for abdominal pain and diarrhea. All other systems reviewed and are negative.              I had a face to face encounter with patient on 2022 at bedside for the following physical exam:     PHYSICAL EXAM:    Visit Vitals  /73 (BP 1 Location: Left upper arm, BP Patient Position: At rest)   Pulse 79   Temp 97.7 °F (36.5 °C)   Resp 16   Ht 5' 10\" (1.778 m)   Wt 85.3 kg (188 lb 0.8 oz)   SpO2 97%   BMI 26.98 kg/m²          Physical Exam  Constitutional:       General: He is not in acute distress. Appearance: He is not ill-appearing, toxic-appearing or diaphoretic. HENT:      Head: Normocephalic and atraumatic. Mouth/Throat:      Mouth: Mucous membranes are moist.      Pharynx: Oropharynx is clear. Eyes:      General:         Right eye: No discharge. Left eye: No discharge. Pulmonary:      Effort: Pulmonary effort is normal.   Abdominal:      General: Abdomen is flat. Genitourinary:     Comments: Diffuse scrotal edema, I and d site stable  Line of demarcation not exceeded                     Data Review:    Review and/or order of clinical lab test      Labs:     Recent Labs     04/01/22 2006   WBC 13.1*   HGB 13.1   HCT 39.4        Recent Labs     04/01/22 2006   *   K 3.6      CO2 27   BUN 12   CREA 1.23   *   CA 8.5     Recent Labs     04/01/22 2006   ALT 13   AP 94   TBILI 1.2*   TP 7.5   ALB 4.0   GLOB 3.5     No results for input(s): INR, PTP, APTT, INREXT in the last 72 hours. No results for input(s): FE, TIBC, PSAT, FERR in the last 72 hours. No results found for: FOL, RBCF   No results for input(s): PH, PCO2, PO2 in the last 72 hours. No results for input(s): CPK, CKNDX, TROIQ in the last 72 hours.     No lab exists for component: CPKMB  No results found for: CHOL, CHOLX, CHLST, CHOLV, HDL, HDLP, LDL, LDLC, DLDLP, TGLX, TRIGL, TRIGP, CHHD, CHHDX  Lab Results   Component Value Date/Time    Glucose (POC) 96 03/01/2020 06:58 AM    Glucose (POC) 129 (H) 02/29/2020 09:39 PM     Lab Results   Component Value Date/Time    Color YELLOW/STRAW 04/01/2022 10:48 PM    Appearance CLEAR 04/01/2022 10:48 PM    Specific gravity 1.028 04/01/2022 10:48 PM    pH (UA) 6.0 04/01/2022 10:48 PM    Protein 30 (A) 04/01/2022 10:48 PM    Glucose Negative 04/01/2022 10:48 PM    Ketone Negative 04/01/2022 10:48 PM    Bilirubin Negative 04/01/2022 10:48 PM    Urobilinogen 2.0 (H) 04/01/2022 10:48 PM    Nitrites Negative 04/01/2022 10:48 PM    Leukocyte Esterase Negative 04/01/2022 10:48 PM    Epithelial cells FEW 04/01/2022 10:48 PM    Bacteria Negative 04/01/2022 10:48 PM    WBC 0-4 04/01/2022 10:48 PM    RBC 0-5 04/01/2022 10:48 PM         Medications Reviewed:     Current Facility-Administered Medications   Medication Dose Route Frequency    lidocaine-EPINEPHrine (PF) (XYLOCAINE) 1 %-1:200,000 injection 45 mg  4.5 mL SubCUTAneous ONCE    traMADoL (ULTRAM) tablet 50 mg  50 mg Oral Q8H PRN    piperacillin-tazobactam (ZOSYN) 3.375 g in 0.9% sodium chloride (MBP/ADV) 100 mL MBP  3.375 g IntraVENous Q8H    clindamycin (CLEOCIN) 900mg D5W 50mL IVPB (premix)  900 mg IntraVENous Q8H    Vancomycin Pharmacy Dosing   Other Rx Dosing/Monitoring    [START ON 4/3/2022] vancomycin (VANCOCIN) 1500 mg in  ml infusion  1,500 mg IntraVENous Q24H    cefTRIAXone (ROCEPHIN) 500 mg in lidocaine (PF) (XYLOCAINE) 10 mg/mL (1 %) IM injection  500 mg IntraMUSCular NOW     ______________________________________________________________________  EXPECTED LENGTH OF STAY: - - -  ACTUAL LENGTH OF STAY:          0                 Manjit Contreras MD

## 2022-04-02 NOTE — PROGRESS NOTES
Bedside shift change report given to Shaneka Desouza (oncoming nurse) by Tolu Soriano and Lisset Phelps RN (offgoing nurse). Report included the following information SBAR, Kardex, Intake/Output, MAR, Accordion and Recent Results.

## 2022-04-02 NOTE — PROGRESS NOTES
Day #1 of Zosyn  Indication:  Chase's gangene  Current regimen:  Rx to dose after 3.375 gram x1  Abx regimen: Vancomycin, Clindamycin and Zosyn  Recent Labs     22   WBC 13.1*   CREA 1.23   BUN 12     Est CrCl: 68 ml/min  Temp (24hrs), Av.7 °F (37.1 °C), Min:97.7 °F (36.5 °C), Max:99.3 °F (37.4 °C)    Cultures: wound, blood and urine    Plan: Change to 3.375 grms q8h

## 2022-04-02 NOTE — PROGRESS NOTES
Pharmacist Note - Vancomycin Dosing    Consult provided for this 61 y.o. male for indication of SSTI. Antibiotic regimen(s): Zosyn, Clindamycin and Vancomycin  Patient on vancomycin PTA? NO     Recent Labs     22   WBC 13.1*   CREA 1.23   BUN 12     Frequency of BMP: daily  Height: 177.8 cm  Weight: 83.5 kg  Est CrCl: 68 ml/min  Temp (24hrs), Av.5 °F (36.9 °C), Min:97.7 °F (36.5 °C), Max:99.3 °F (37.4 °C)    Cultures:  Wound, blood and urine    MRSA Swab ordered (if applicable)? N/A    The plan below is expected to result in a target range of AUC/-600    Therapy will be initiated with a loading dose of 2000 mg IV x 1 to be followed by a maintenance dose of 1500 mg IV every 24 hours. Pharmacy to follow patient daily and order levels / make dose adjustments as appropriate. *Vancomycin has been dosed used Bayesian kinetics software to target an AUC/ROZ of 400-600, which provides adequate exposure for an assumed infection due to MRSA with an ROZ of 1 or less while reducing the risk of nephrotoxicity as seen with traditional trough based dosing goals.

## 2022-04-02 NOTE — ED PROVIDER NOTES
Please note that this dictation was completed with [employer], the computer voice recognition software.  Quite often unanticipated grammatical, syntax, homophones, and other interpretive errors are inadvertently transcribed by the computer software.  Please disregard these errors.  Please excuse any errors that have escaped final proofreading. 65-year-old male past medical history markable for hepatitis C, HIV, and depression presents the ER POV complaining of \" started having a little bit of pain on the left side of my scrotum 3 days ago. It started small but is progressively gotten worse and worse and worse and now it hurts to even sit up. \"  Patient states \"I felt warm at home but not had any overt fevers (positive subjective fevers) may be some chills. \"  Patient states he has been tolerating p.o. normally urinating normally. He says the swelling and redness are just gradually worsened he has never had anything like this in the past.  He denies any trauma to the area denies other current systemic complaints. pt denies HA, vison changes, diff swallowing, CP, SOB, Abd pain, N/V, D/Cons or other current systemic complaints    Social/ PSH reviewed in EMR    EMR Chart Reviewed           Past Medical History:   Diagnosis Date    Hepatitis C     HIV infection ([facility] [address]     Psychiatric disorder     depression       Past Surgical History:   Procedure Laterality Date    HX ADENOIDECTOMY           Family History:   Problem Relation Age of Onset    Hypertension Unknown        Social History     Socioeconomic History    Marital status: SINGLE     Spouse name: Not on file    Number of children: Not on file    Years of education: Not on file    Highest education level: Not on file   Occupational History    Not on file   Tobacco Use    Smoking status: Never Smoker    Smokeless tobacco: Not on file   Substance and Sexual Activity    Alcohol use:  Yes    Drug use: No    Sexual activity: Not on file   Other Topics Concern    Not on file   Social History Narrative    Not on file     Social Determinants of Health     Financial Resource Strain:     Difficulty of Paying Living Expenses: Not on file   Food Insecurity:     Worried About Running Out of Food in the Last Year: Not on file    Nilesh of Food in the Last Year: Not on file   Transportation Needs:     Lack of Transportation (Medical): Not on file    Lack of Transportation (Non-Medical): Not on file   Physical Activity:     Days of Exercise per Week: Not on file    Minutes of Exercise per Session: Not on file   Stress:     Feeling of Stress : Not on file   Social Connections:     Frequency of Communication with Friends and Family: Not on file    Frequency of Social Gatherings with Friends and Family: Not on file    Attends Confucianist Services: Not on file    Active Member of [address] [place] [address] [employer] or Organizations: Not on file    Attends Club or Organization Meetings: Not on file    Marital Status: Not on file   Intimate Partner Violence:     Fear of Current or Ex-Partner: Not on file    Emotionally Abused: Not on file    Physically Abused: Not on file    Sexually Abused: Not on file   Housing Stability:     Unable to Pay for Housing in the Last Year: Not on file    Number of [place] in the Last Year: Not on file    Unstable Housing in the Last Year: Not on file         ALLERGIES: Sulfa (sulfonamide antibiotics)    Review of Systems   Constitutional: Positive for chills and fever. Negative for appetite change. HENT: Negative for drooling, trouble swallowing and voice change. Eyes: Negative for visual disturbance. Respiratory: Negative for cough, choking, chest tightness and shortness of breath. Gastrointestinal: Positive for abdominal pain. Negative for diarrhea, nausea and vomiting. Genitourinary: Positive for genital sores, penile swelling, scrotal swelling and testicular pain. Negative for dysuria, hematuria and penile discharge.    Musculoskeletal: Negative for back pain. Skin: Positive for color change and rash. Negative for wound. Neurological: Negative for facial asymmetry and speech difficulty. Psychiatric/Behavioral: Negative for confusion. All other systems reviewed and are negative. Vitals:    04/01/22 1957 04/01/22 2141   BP: 126/86 104/72   Pulse: 76 87   Resp: 18 17   Temp: 97.7 °F (36.5 °C) 99.3 °F (37.4 °C)   SpO2: 99% 100%   Weight: 83.5 kg (184 lb)    Height: 5' 10\" (1.778 m)             Physical Exam  Vitals and nursing note reviewed. Exam conducted with a chaperone present. Constitutional:       General: He is not in acute distress. Appearance: Normal appearance. He is well-developed. He is obese. He is not ill-appearing, toxic-appearing or diaphoretic. Comments: Uncomfortable appearing, AxOx4, speaking in complete sentences   HENT:      Head: Normocephalic and atraumatic. Right Ear: External ear normal.      Left Ear: External ear normal.      Mouth/Throat:      Pharynx: No oropharyngeal exudate. Eyes:      General: No scleral icterus. Right eye: No discharge. Left eye: No discharge. Extraocular Movements: Extraocular movements intact. Conjunctiva/sclera: Conjunctivae normal.      Pupils: Pupils are equal, round, and reactive to light. Cardiovascular:      Rate and Rhythm: Normal rate and regular rhythm. Heart sounds: No murmur heard. No friction rub. No gallop. Pulmonary:      Effort: Pulmonary effort is normal. No respiratory distress. Breath sounds: Normal breath sounds. No stridor. No wheezing, rhonchi or rales. Chest:      Chest wall: No tenderness. Abdominal:      General: Bowel sounds are normal. There is no distension. Palpations: Abdomen is soft. There is no mass. Tenderness: There is no abdominal tenderness. There is no guarding or rebound. Genitourinary:     Penis: Circumcised. Tenderness and swelling present. No phimosis or lesions. Testes:         Right: Tenderness present. Left: Tenderness present. Epididymis:      Left: No mass or tenderness. Liborio stage (genital): 5. Comments: Noted redness/ swelling of scrotum/ induration/ firmness to L side of scrotum consistent w/ abscess; redness in 'bikini' distribution;   Musculoskeletal:         General: Normal range of motion. Cervical back: Normal range of motion and neck supple. Lymphadenopathy:      Cervical: No cervical adenopathy. Skin:     General: Skin is warm and dry. Findings: No erythema or rash. Neurological:      Mental Status: He is alert and oriented to person, place, and time. Cranial Nerves: No cranial nerve deficit. Coordination: Coordination normal.          MDM       Procedures    10:37 PM  [facility]/ Psychiatry has evaluated the Pt and have deemed the pt safe for discharge. They have also arranged close follow-up/ additional support as needed. 11:07 PM  Pt told of results/ agrees w/ plans for evaluation for admission/ Urology evaluation;     [address] [place] [address] Road for Admission  11:10 PM    ED Room Number: ER30/30  Patient Name and age:  Filomena Fischer 61 y.o.  male  Working Diagnosis:   1. STI (sexually transmitted infection)    2. Scrotal abscess        COVID-19 Suspicion:  no  Sepsis present:  no  Reassessment needed: yes  Code Status:  Full Code  Readmission: no  Isolation Requirements:  no  Recommended Level of Care:  med/surg  Department:[facility] Adult ED - 21   Other:  Given clinda/ Vanco/ Zosyn for cellulitis/ abscess; then Rocephin/ Doxy for Orchitis/ epididymitis;     CONSULT  NOTE  11:36 PM  Johnny Adair MD spoke with Dr eRnae Stuart. Specialty: Urology  Discussed pt's hx, disposition, and available diagnostic and imaging results. Reviewed care plans. Consulting physician agrees with plans as outlined 'I will come by and see him'; Meryle Sandman

## 2022-04-02 NOTE — PROGRESS NOTES
I and D of left scrotal abscess completed  Tolerated it well. Culture sent    We esperanza a line around the red area. Will check on him tomorrow.

## 2022-04-03 LAB
ANION GAP SERPL CALC-SCNC: 3 MMOL/L (ref 5–15)
BASOPHILS # BLD: 0.1 K/UL (ref 0–0.1)
BASOPHILS NFR BLD: 1 % (ref 0–1)
BUN SERPL-MCNC: 11 MG/DL (ref 6–20)
BUN/CREAT SERPL: 11 (ref 12–20)
CALCIUM SERPL-MCNC: 8.2 MG/DL (ref 8.5–10.1)
CHLORIDE SERPL-SCNC: 109 MMOL/L (ref 97–108)
CO2 SERPL-SCNC: 25 MMOL/L (ref 21–32)
CREAT SERPL-MCNC: 0.98 MG/DL (ref 0.7–1.3)
DIFFERENTIAL METHOD BLD: ABNORMAL
EOSINOPHIL # BLD: 0.2 K/UL (ref 0–0.4)
EOSINOPHIL NFR BLD: 2 % (ref 0–7)
ERYTHROCYTE [DISTWIDTH] IN BLOOD BY AUTOMATED COUNT: 14.1 % (ref 11.5–14.5)
GLUCOSE SERPL-MCNC: 97 MG/DL (ref 65–100)
HCT VFR BLD AUTO: 37.2 % (ref 36.6–50.3)
HGB BLD-MCNC: 12.3 G/DL (ref 12.1–17)
IMM GRANULOCYTES # BLD AUTO: 0 K/UL (ref 0–0.04)
IMM GRANULOCYTES NFR BLD AUTO: 0 % (ref 0–0.5)
LYMPHOCYTES # BLD: 1.5 K/UL (ref 0.8–3.5)
LYMPHOCYTES NFR BLD: 15 % (ref 12–49)
MCH RBC QN AUTO: 31 PG (ref 26–34)
MCHC RBC AUTO-ENTMCNC: 33.1 G/DL (ref 30–36.5)
MCV RBC AUTO: 93.7 FL (ref 80–99)
MONOCYTES # BLD: 0.6 K/UL (ref 0–1)
MONOCYTES NFR BLD: 6 % (ref 5–13)
NEUTS SEG # BLD: 7.8 K/UL (ref 1.8–8)
NEUTS SEG NFR BLD: 76 % (ref 32–75)
NRBC # BLD: 0 K/UL (ref 0–0.01)
NRBC BLD-RTO: 0 PER 100 WBC
PLATELET # BLD AUTO: 166 K/UL (ref 150–400)
PMV BLD AUTO: 9 FL (ref 8.9–12.9)
POTASSIUM SERPL-SCNC: 4.1 MMOL/L (ref 3.5–5.1)
RBC # BLD AUTO: 3.97 M/UL (ref 4.1–5.7)
RBC MORPH BLD: ABNORMAL
SODIUM SERPL-SCNC: 137 MMOL/L (ref 136–145)
WBC # BLD AUTO: 10.2 K/UL (ref 4.1–11.1)

## 2022-04-03 PROCEDURE — 74011250636 HC RX REV CODE- 250/636: Performed by: UROLOGY

## 2022-04-03 PROCEDURE — 36415 COLL VENOUS BLD VENIPUNCTURE: CPT

## 2022-04-03 PROCEDURE — 74011250636 HC RX REV CODE- 250/636: Performed by: INTERNAL MEDICINE

## 2022-04-03 PROCEDURE — 74011250636 HC RX REV CODE- 250/636: Performed by: STUDENT IN AN ORGANIZED HEALTH CARE EDUCATION/TRAINING PROGRAM

## 2022-04-03 PROCEDURE — 85025 COMPLETE CBC W/AUTO DIFF WBC: CPT

## 2022-04-03 PROCEDURE — 65270000032 HC RM SEMIPRIVATE

## 2022-04-03 PROCEDURE — 74011250636 HC RX REV CODE- 250/636: Performed by: HOSPITALIST

## 2022-04-03 PROCEDURE — 74011250637 HC RX REV CODE- 250/637: Performed by: NURSE PRACTITIONER

## 2022-04-03 PROCEDURE — 74011250637 HC RX REV CODE- 250/637: Performed by: STUDENT IN AN ORGANIZED HEALTH CARE EDUCATION/TRAINING PROGRAM

## 2022-04-03 PROCEDURE — 74011000258 HC RX REV CODE- 258: Performed by: STUDENT IN AN ORGANIZED HEALTH CARE EDUCATION/TRAINING PROGRAM

## 2022-04-03 PROCEDURE — 74011250637 HC RX REV CODE- 250/637: Performed by: INTERNAL MEDICINE

## 2022-04-03 PROCEDURE — 80048 BASIC METABOLIC PNL TOTAL CA: CPT

## 2022-04-03 RX ORDER — ACETAMINOPHEN 325 MG/1
650 TABLET ORAL
Status: DISCONTINUED | OUTPATIENT
Start: 2022-04-03 | End: 2022-04-05 | Stop reason: HOSPADM

## 2022-04-03 RX ORDER — KETOROLAC TROMETHAMINE 30 MG/ML
30 INJECTION, SOLUTION INTRAMUSCULAR; INTRAVENOUS
Status: COMPLETED | OUTPATIENT
Start: 2022-04-03 | End: 2022-04-03

## 2022-04-03 RX ORDER — VANCOMYCIN HYDROCHLORIDE
1250
Status: DISCONTINUED | OUTPATIENT
Start: 2022-04-03 | End: 2022-04-05

## 2022-04-03 RX ORDER — OXYCODONE HYDROCHLORIDE 5 MG/1
5 TABLET ORAL
Status: DISCONTINUED | OUTPATIENT
Start: 2022-04-03 | End: 2022-04-05 | Stop reason: HOSPADM

## 2022-04-03 RX ORDER — KETOROLAC TROMETHAMINE 30 MG/ML
15 INJECTION, SOLUTION INTRAMUSCULAR; INTRAVENOUS EVERY 6 HOURS
Status: DISCONTINUED | OUTPATIENT
Start: 2022-04-03 | End: 2022-04-03

## 2022-04-03 RX ORDER — KETOROLAC TROMETHAMINE 30 MG/ML
15 INJECTION, SOLUTION INTRAMUSCULAR; INTRAVENOUS EVERY 6 HOURS
Status: DISCONTINUED | OUTPATIENT
Start: 2022-04-03 | End: 2022-04-04

## 2022-04-03 RX ADMIN — VANCOMYCIN HYDROCHLORIDE 1250 MG: 1.25 INJECTION, POWDER, LYOPHILIZED, FOR SOLUTION INTRAVENOUS at 18:30

## 2022-04-03 RX ADMIN — SERTRALINE 200 MG: 50 TABLET, FILM COATED ORAL at 09:35

## 2022-04-03 RX ADMIN — PIPERACILLIN SODIUM AND TAZOBACTAM SODIUM 3.38 G: 3; 375 INJECTION, POWDER, FOR SOLUTION INTRAVENOUS at 14:39

## 2022-04-03 RX ADMIN — VANCOMYCIN HYDROCHLORIDE 1500 MG: 10 INJECTION, POWDER, LYOPHILIZED, FOR SOLUTION INTRAVENOUS at 01:45

## 2022-04-03 RX ADMIN — CLINDAMYCIN PHOSPHATE 900 MG: 900 INJECTION, SOLUTION INTRAVENOUS at 05:04

## 2022-04-03 RX ADMIN — KETOROLAC TROMETHAMINE 30 MG: 30 INJECTION, SOLUTION INTRAMUSCULAR; INTRAVENOUS at 13:11

## 2022-04-03 RX ADMIN — OXYCODONE 5 MG: 5 TABLET ORAL at 21:24

## 2022-04-03 RX ADMIN — LORAZEPAM 0.5 MG: 1 TABLET ORAL at 18:17

## 2022-04-03 RX ADMIN — BICTEGRAVIR SODIUM, EMTRICITABINE, AND TENOFOVIR ALAFENAMIDE FUMARATE 1 TABLET: 50; 200; 25 TABLET ORAL at 09:35

## 2022-04-03 RX ADMIN — ZOLPIDEM TARTRATE 10 MG: 5 TABLET ORAL at 22:47

## 2022-04-03 RX ADMIN — CETIRIZINE HYDROCHLORIDE 10 MG: 10 TABLET, FILM COATED ORAL at 22:47

## 2022-04-03 RX ADMIN — ENOXAPARIN SODIUM 40 MG: 100 INJECTION SUBCUTANEOUS at 09:35

## 2022-04-03 RX ADMIN — KETOROLAC TROMETHAMINE 15 MG: 30 INJECTION, SOLUTION INTRAMUSCULAR; INTRAVENOUS at 22:54

## 2022-04-03 RX ADMIN — PIPERACILLIN SODIUM AND TAZOBACTAM SODIUM 3.38 G: 3; 375 INJECTION, POWDER, FOR SOLUTION INTRAVENOUS at 07:07

## 2022-04-03 RX ADMIN — PIPERACILLIN SODIUM AND TAZOBACTAM SODIUM 3.38 G: 3; 375 INJECTION, POWDER, FOR SOLUTION INTRAVENOUS at 22:47

## 2022-04-03 RX ADMIN — LORAZEPAM 0.5 MG: 1 TABLET ORAL at 09:35

## 2022-04-03 RX ADMIN — TRAMADOL HYDROCHLORIDE 50 MG: 50 TABLET, COATED ORAL at 09:35

## 2022-04-03 RX ADMIN — KETOROLAC TROMETHAMINE 15 MG: 30 INJECTION, SOLUTION INTRAMUSCULAR; INTRAVENOUS at 18:17

## 2022-04-03 RX ADMIN — TRAMADOL HYDROCHLORIDE 50 MG: 50 TABLET, COATED ORAL at 18:17

## 2022-04-03 NOTE — PROGRESS NOTES
Progress Note  Date:4/3/2022       Room:[place] [address]  Patient Name:Sekou Brumfield     YOB: 1965     Age:56 y.o. Subjective    Subjective:  Symptoms:  Stable. Diet:  Adequate intake. Pain:  He complains of pain that is moderate. He reports pain is unchanged. Pain is partially controlled. Review of Systems   Constitutional: Negative for fever. Objective         Vitals Last 24 Hours:  TEMPERATURE:  Temp  Av °F (36.7 °C)  Min: 97.3 °F (36.3 °C)  Max: 98.5 °F (36.9 °C)  RESPIRATIONS RANGE: Resp  Av.5  Min: 14  Max: 15  PULSE OXIMETRY RANGE: SpO2  Av.5 %  Min: 94 %  Max: 97 %  PULSE RANGE: Pulse  Av.5  Min: 80  Max: 86  BLOOD PRESSURE RANGE: Systolic (51ZYH), SGZ:064 , Min:105 , DJP:022   ; Diastolic (99LWM), MHV:82, Min:63, Max:88    I/O (24Hr): Intake/Output Summary (Last 24 hours) at 4/3/2022 1240  Last data filed at 4/3/2022 0942  Gross per 24 hour   Intake 300 ml   Output 100 ml   Net 200 ml     Objective:  General Appearance:  Uncomfortable and well-appearing. Vital signs: (most recent): Blood pressure 126/88, pulse 84, temperature 97.8 °F (36.6 °C), resp. rate 15, height 5' 10\" (1.778 m), weight 87.6 kg (193 lb 1.6 oz), SpO2 97 %. No fever. Output: Producing urine. Lungs:  Normal effort. Wound packing is out  Skin erythema is much better   There is still brawny discoloration over a somewhat smaller area    Labs/Imaging/Diagnostics    Labs:  CBC:  Recent Labs     22   WBC 13.1*   RBC 4.22   HGB 13.1   HCT 39.4   MCV 93.4   RDW 14.1        CHEMISTRIES:  Recent Labs     22  0510 22    133*   K 4.1 3.6   * 101   CO2 25 27   BUN 11 12   CA 8.2* 8.5   PT/INR:No results for input(s): INR, INREXT in the last 72 hours. No lab exists for component: PROTIME  APTT:No results for input(s): APTT in the last 72 hours.   LIVER PROFILE:  Recent Labs     22   AST 5*   ALT 13     Lab Results   Component Value Date/Time    ALT (SGPT) 13 04/01/2022 08:06 PM    AST (SGOT) 5 (L) 04/01/2022 08:06 PM    Alk. phosphatase 94 04/01/2022 08:06 PM    Bilirubin, total 1.2 (H) 04/01/2022 08:06 PM     Urine culture proteus 6000 CFU- this is likely culprit for the orchitis   Wound Culture - MRSA- different issue-       Imaging Last 24 Hours:  No results found. Assessment//Plan   Active Problems:    Cellulitis (2/28/2020)      Tenosynovitis (3/4/2020)      Orchitis and epididymitis (4/2/2022)      Assessment & Plan  Get S on the proteus  Cont iv abx for both bacteria  NSAIDS to reduce orchitis   Consult wound care   OOB   Electronically signed by Edelmira Montes MD on 4/3/2022 at 12:40 PM      Spoke to the lab- they will get S on the proteus.

## 2022-04-03 NOTE — PROGRESS NOTES
Day #2 of Vancomycin  Indication:  scrotal cellulitis with abscess now s/p I&D  - PMH: hep C, HIV  Current regimen:  vanc 1.5g q24h  Abx regimen:  vanc/pip-tazo/clinda  ID Following ?: NO  Concomitant nephrotoxic drugs (requires more frequent monitoring): None  Frequency of BMP?: daily    Recent Labs     22  0510 22   WBC  --  13.1*   CREA 0.98 1.23   BUN 11 12     Est CrCl: 90-95 ml/min; UO: -- ml/kg/hr  Temp (24hrs), Av °F (36.7 °C), Min:97.3 °F (36.3 °C), Max:98.5 °F (36.9 °C)    Cultures:    urine: 6k cfus Proteus; final   blood: NG x1 day; prelim   abscess: mod MRSA; prelim    MRSA Swab ordered (if applicable)? N/A    Goal target range AUC/-600    Plan: Change to vanc 1.25g q18h as predicted AUC/ROZ on current regimen below target range. Will assess regimen with a random level 24-48h into new dosing strategy. Noted MSRA on antigen detection from abscess drainage. Follow cultures.

## 2022-04-03 NOTE — PROGRESS NOTES
Transition of Care Plan  RUR 6%    Urology following  Wound Care consulted  IV abx     Disposition   Home    Transportation   Friend    Home health   Will order if needed  No hx of [facility]    Medical follow up   PCP and specialist    Contact  Friend Dimas Longoria  [phone]  Life Partner  Santosh Machuca [phone]      Reason for Admission:   Orchitis and epididymitis (left scrotum wound--Iand D 4/1/22  Receiving IV abx  Hx of hepatitis, HIV, depression                 RUR Score:    6%                 Plan for utilizing home health: Will arrange if ordered        PCP: First and Last name:  Osei Villar MD     Name of Practice:    Are you a current patient: Yes/No: yes   Approximate date of last visit: has appointment in 3 weeks    Can you participate in a virtual visit with your PCP: no                    Current Advanced Directive/Advance Care Plan: Full Code      Healthcare Decision Maker:   Click here to complete [address] including selection of the Healthcare Decision Maker Relationship (ie \"Primary\")                          Transition of Care Plan:       Home with partner and medical follow up   Cm will arrange [facility] if needed. Cm met with patient in his room to introduce self and explain role. Patient was alert and oriented- Confirmed demographics, PCP and insurance-- he has [employer]--    No preference of pharmacy   No Hx of HH  No hx of IPR/SNF  Independent and self care prior to admission    Patient lives in his home with his life partner. He was self care and independent prior to admission using no dme. Patient has no family in [place] but does have family in [place] and [address] [place] [address]. Patient works full time or [employer]. Cm will follow patient medical progress and assist with any transition of care needs that arise. Care Management Interventions  PCP Verified by CM: Yes  Mode of Transport at Discharge:  Other (see comment) (car with friend)  Transition of Care Consult (CM Consult):  Other  Discharge Durable Medical Equipment: No  Physical Therapy Consult: No  Occupational Therapy Consult: No  Support Systems: Spouse/Significant Other,Friend/Neighbor  Confirm Follow Up Transport: Friends (self)  Discharge Location  Patient Expects to be Discharged to[de-identified] Home

## 2022-04-03 NOTE — PROGRESS NOTES
Hospitalist Progress Note              Zac Jean MD.                                                             Cell: [phone]                               NAME:  Babita Mcallister  :  1965  MRN:  [id number]  Date of Service:  4/3/2022    Summary: 61 y.o. male presents with 3 days duration of abrupt onset, gradually worsening, severe, constant, nonradiating, sharp, scrotal pain that is associated with subjective fevers. Patient was admitted for scrotal abscess. Assessment/Plan:  Scrotal abscess with suprapubic cellulitis; POA; s/p I & D  Orchitis/Epididymitis  Less likely janet's gangrene as there was no crepitus  Wound culture growing MRSA  Continue vancomycin and Zosyn. Can d/c zosyn tomorrow if no growth of gram negatives  D/c clindamycin as there was no evidence of fourniers gangrene  Wound consult    HIV; On Biktarvy  H/o HCV: Patient has achieved SVR. HCV RNA negative in 2021     Code status: full  DVT prophylaxsis: Lovenox  Dispo: Continue inpatient care         Interval History/Subjective:  F/u for scrotal abscess  Still has redness supra-pubic region but overall improving  No fever    Review of Systems:  A comprehensive review of systems was negative except for that written in the HPI. Objective:     VITALS:   Last 24hrs VS reviewed since prior progress note. Most recent are:  Visit Vitals  /88 (BP 1 Location: Left upper arm, BP Patient Position: At rest)   Pulse 84   Temp 97.8 °F (36.6 °C)   Resp 15   Ht 5' 10\" (1.778 m)   Wt 87.6 kg (193 lb 1.6 oz)   SpO2 97%   BMI 27.71 kg/m²       Intake/Output Summary (Last 24 hours) at 4/3/2022 1413  Last data filed at 4/3/2022 0942  Gross per 24 hour   Intake 300 ml   Output 100 ml   Net 200 ml        PHYSICAL EXAM:  General: No acute distress, cooperative, pleasant   EENT: EOMI. Anicteric sclerae. Oral mucous moist, oropharynx benign  Resp: CTA bilaterally.  No wheezing/rhonchi/rales. No accessory muscle use  CV: Regular rhythm, normal rate, no murmurs, gallops, rubs  GI: Soft, non distended, non tender. normoactive bowel sounds, no hepatosplenomegaly Extremities: No edema, warm, 2+ pulses throughout  Neurologic: Moves all extremities. AAOx3, CN II-XII grossly intact  Psych: Good insight. Not anxious nor agitated. Skin: Good Turgor, no rashes or ulcers  ; Supra pubic erythema. No purulent drainage     Lab Data Personally Reviewed: (see below)     Medications list Personally Reviewed:  x YES  NO     _______________________________________________________________________  Care Plan discussed with:  Patient/Family and Nurse    Total NON critical care TIME:  30 minutes    Eileen Gonzalez MD     Procedures: see electronic medical records for all procedures/Xrays and details which were not copied into this note but were reviewed prior to creation of Plan. LABS:  Recent Labs     04/03/22  1213 04/01/22 2006   WBC 10.2 13.1*   HGB 12.3 13.1   HCT 37.2 39.4    164     Recent Labs     04/03/22  0510 04/01/22 2006    133*   K 4.1 3.6   * 101   CO2 25 27   BUN 11 12   CREA 0.98 1.23   GLU 97 134*   CA 8.2* 8.5     Recent Labs     04/01/22 2006   ALT 13   AP 94   TBILI 1.2*   TP 7.5   ALB 4.0   GLOB 3.5     No results for input(s): INR, PTP, APTT, INREXT in the last 72 hours. No results for input(s): FE, TIBC, PSAT, FERR in the last 72 hours. No results found for: FOL, RBCF   No results for input(s): PH, PCO2, PO2 in the last 72 hours. No results for input(s): CPK, CKNDX, TROIQ in the last 72 hours.     No lab exists for component: CPKMB  No results found for: CHOL, CHOLX, CHLST, CHOLV, HDL, HDLP, LDL, LDLC, DLDLP, TGLX, TRIGL, TRIGP, CHHD, CHHDX  Lab Results   Component Value Date/Time    Glucose (POC) 96 03/01/2020 06:58 AM    Glucose (POC) 129 (H) 02/29/2020 09:39 PM     Lab Results   Component Value Date/Time    Color YELLOW/STRAW 04/01/2022 10:48 PM    Appearance CLEAR 04/01/2022 10:48 PM    Specific gravity 1.028 04/01/2022 10:48 PM    pH (UA) 6.0 04/01/2022 10:48 PM    Protein 30 (A) 04/01/2022 10:48 PM    Glucose Negative 04/01/2022 10:48 PM    Ketone Negative 04/01/2022 10:48 PM    Bilirubin Negative 04/01/2022 10:48 PM    Urobilinogen 2.0 (H) 04/01/2022 10:48 PM    Nitrites Negative 04/01/2022 10:48 PM    Leukocyte Esterase Negative 04/01/2022 10:48 PM    Epithelial cells FEW 04/01/2022 10:48 PM    Bacteria Negative 04/01/2022 10:48 PM    WBC 0-4 04/01/2022 10:48 PM    RBC 0-5 04/01/2022 10:48 PM

## 2022-04-03 NOTE — PROGRESS NOTES
Bedside shift change report given to César harris RN (oncoming nurse) by Ignacio Bauman and Millicent Miner RN(offgoing nurse). Report included the following information SBAR, Kardex, Intake/Output, MAR, Accordion and Recent Results.

## 2022-04-04 LAB
ANION GAP SERPL CALC-SCNC: 1 MMOL/L (ref 5–15)
ANION GAP SERPL CALC-SCNC: 5 MMOL/L (ref 5–15)
BACTERIA SPEC CULT: ABNORMAL
BASOPHILS # BLD: 0.1 K/UL (ref 0–0.1)
BASOPHILS NFR BLD: 2 % (ref 0–1)
BUN SERPL-MCNC: 16 MG/DL (ref 6–20)
BUN SERPL-MCNC: 17 MG/DL (ref 6–20)
BUN/CREAT SERPL: 13 (ref 12–20)
BUN/CREAT SERPL: 13 (ref 12–20)
C TRACH RRNA SPEC QL NAA+PROBE: NEGATIVE
CALCIUM SERPL-MCNC: 8.8 MG/DL (ref 8.5–10.1)
CALCIUM SERPL-MCNC: 8.9 MG/DL (ref 8.5–10.1)
CHLORIDE SERPL-SCNC: 107 MMOL/L (ref 97–108)
CHLORIDE SERPL-SCNC: 107 MMOL/L (ref 97–108)
CO2 SERPL-SCNC: 26 MMOL/L (ref 21–32)
CO2 SERPL-SCNC: 29 MMOL/L (ref 21–32)
CREAT SERPL-MCNC: 1.24 MG/DL (ref 0.7–1.3)
CREAT SERPL-MCNC: 1.27 MG/DL (ref 0.7–1.3)
DIFFERENTIAL METHOD BLD: ABNORMAL
EOSINOPHIL # BLD: 0.2 K/UL (ref 0–0.4)
EOSINOPHIL NFR BLD: 3 % (ref 0–7)
ERYTHROCYTE [DISTWIDTH] IN BLOOD BY AUTOMATED COUNT: 13.8 % (ref 11.5–14.5)
GLUCOSE SERPL-MCNC: 139 MG/DL (ref 65–100)
GLUCOSE SERPL-MCNC: 98 MG/DL (ref 65–100)
GRAM STN SPEC: ABNORMAL
HCT VFR BLD AUTO: 41.2 % (ref 36.6–50.3)
HGB BLD-MCNC: 13.8 G/DL (ref 12.1–17)
IMM GRANULOCYTES # BLD AUTO: 0.4 K/UL (ref 0–0.04)
IMM GRANULOCYTES NFR BLD AUTO: 6 % (ref 0–0.5)
LYMPHOCYTES # BLD: 1.2 K/UL (ref 0.8–3.5)
LYMPHOCYTES NFR BLD: 19 % (ref 12–49)
MCH RBC QN AUTO: 31.1 PG (ref 26–34)
MCHC RBC AUTO-ENTMCNC: 33.5 G/DL (ref 30–36.5)
MCV RBC AUTO: 92.8 FL (ref 80–99)
MONOCYTES # BLD: 0.4 K/UL (ref 0–1)
MONOCYTES NFR BLD: 7 % (ref 5–13)
N GONORRHOEA RRNA SPEC QL NAA+PROBE: NEGATIVE
NEUTS SEG # BLD: 4.1 K/UL (ref 1.8–8)
NEUTS SEG NFR BLD: 63 % (ref 32–75)
NRBC # BLD: 0 K/UL (ref 0–0.01)
NRBC BLD-RTO: 0 PER 100 WBC
PLATELET # BLD AUTO: 185 K/UL (ref 150–400)
PMV BLD AUTO: 9 FL (ref 8.9–12.9)
POTASSIUM SERPL-SCNC: 4 MMOL/L (ref 3.5–5.1)
POTASSIUM SERPL-SCNC: 4.3 MMOL/L (ref 3.5–5.1)
RBC # BLD AUTO: 4.44 M/UL (ref 4.1–5.7)
RBC MORPH BLD: ABNORMAL
SERVICE CMNT-IMP: ABNORMAL
SODIUM SERPL-SCNC: 137 MMOL/L (ref 136–145)
SODIUM SERPL-SCNC: 138 MMOL/L (ref 136–145)
SPECIMEN SOURCE: NORMAL
WBC # BLD AUTO: 6.4 K/UL (ref 4.1–11.1)

## 2022-04-04 PROCEDURE — 74011250637 HC RX REV CODE- 250/637: Performed by: NURSE PRACTITIONER

## 2022-04-04 PROCEDURE — 74011250636 HC RX REV CODE- 250/636: Performed by: UROLOGY

## 2022-04-04 PROCEDURE — 65270000032 HC RM SEMIPRIVATE

## 2022-04-04 PROCEDURE — 80048 BASIC METABOLIC PNL TOTAL CA: CPT

## 2022-04-04 PROCEDURE — 74011000258 HC RX REV CODE- 258: Performed by: STUDENT IN AN ORGANIZED HEALTH CARE EDUCATION/TRAINING PROGRAM

## 2022-04-04 PROCEDURE — 87536 HIV-1 QUANT&REVRSE TRNSCRPJ: CPT

## 2022-04-04 PROCEDURE — 77030041076 HC DRSG AG OPTICELL MDII -A

## 2022-04-04 PROCEDURE — 85025 COMPLETE CBC W/AUTO DIFF WBC: CPT

## 2022-04-04 PROCEDURE — 2709999900 HC NON-CHARGEABLE SUPPLY

## 2022-04-04 PROCEDURE — 74011250637 HC RX REV CODE- 250/637: Performed by: INTERNAL MEDICINE

## 2022-04-04 PROCEDURE — 74011250636 HC RX REV CODE- 250/636: Performed by: INTERNAL MEDICINE

## 2022-04-04 PROCEDURE — 74011250636 HC RX REV CODE- 250/636: Performed by: HOSPITALIST

## 2022-04-04 PROCEDURE — 74011250637 HC RX REV CODE- 250/637: Performed by: STUDENT IN AN ORGANIZED HEALTH CARE EDUCATION/TRAINING PROGRAM

## 2022-04-04 PROCEDURE — 86361 T CELL ABSOLUTE COUNT: CPT

## 2022-04-04 PROCEDURE — 36415 COLL VENOUS BLD VENIPUNCTURE: CPT

## 2022-04-04 PROCEDURE — 74011250636 HC RX REV CODE- 250/636: Performed by: STUDENT IN AN ORGANIZED HEALTH CARE EDUCATION/TRAINING PROGRAM

## 2022-04-04 RX ADMIN — PIPERACILLIN SODIUM AND TAZOBACTAM SODIUM 3.38 G: 3; 375 INJECTION, POWDER, FOR SOLUTION INTRAVENOUS at 16:33

## 2022-04-04 RX ADMIN — ENOXAPARIN SODIUM 40 MG: 100 INJECTION SUBCUTANEOUS at 10:06

## 2022-04-04 RX ADMIN — OXYCODONE 5 MG: 5 TABLET ORAL at 23:04

## 2022-04-04 RX ADMIN — KETOROLAC TROMETHAMINE 15 MG: 30 INJECTION, SOLUTION INTRAMUSCULAR; INTRAVENOUS at 06:33

## 2022-04-04 RX ADMIN — PIPERACILLIN SODIUM AND TAZOBACTAM SODIUM 3.38 G: 3; 375 INJECTION, POWDER, FOR SOLUTION INTRAVENOUS at 22:57

## 2022-04-04 RX ADMIN — ZOLPIDEM TARTRATE 10 MG: 5 TABLET ORAL at 22:57

## 2022-04-04 RX ADMIN — BICTEGRAVIR SODIUM, EMTRICITABINE, AND TENOFOVIR ALAFENAMIDE FUMARATE 1 TABLET: 50; 200; 25 TABLET ORAL at 10:05

## 2022-04-04 RX ADMIN — CETIRIZINE HYDROCHLORIDE 10 MG: 10 TABLET, FILM COATED ORAL at 22:57

## 2022-04-04 RX ADMIN — VANCOMYCIN HYDROCHLORIDE 1250 MG: 1.25 INJECTION, POWDER, LYOPHILIZED, FOR SOLUTION INTRAVENOUS at 13:00

## 2022-04-04 RX ADMIN — PIPERACILLIN SODIUM AND TAZOBACTAM SODIUM 3.38 G: 3; 375 INJECTION, POWDER, FOR SOLUTION INTRAVENOUS at 06:33

## 2022-04-04 RX ADMIN — LORAZEPAM 0.5 MG: 1 TABLET ORAL at 10:05

## 2022-04-04 RX ADMIN — OXYCODONE 5 MG: 5 TABLET ORAL at 03:44

## 2022-04-04 RX ADMIN — SERTRALINE 200 MG: 50 TABLET, FILM COATED ORAL at 10:06

## 2022-04-04 RX ADMIN — OXYCODONE 5 MG: 5 TABLET ORAL at 13:20

## 2022-04-04 RX ADMIN — LORAZEPAM 0.5 MG: 1 TABLET ORAL at 17:48

## 2022-04-04 NOTE — CONSULTS
Infectious Disease Consult    Today's Date: 4/4/2022   Admit Date: 4/1/2022    Impression:   Orchitis  Scrotal abscess  Scrotal cellulitis  S/p I & D (4/2)  Hep C  - afebrile, WBC 6.4    Urine cx (4/1) proteus species    Wound cx (4/2) mrsa    US of scrotum (4/1)  1. Diffuse scrotal edema. Small, septated left hydrocele. 2. Enlarged, hypervascular and heterogeneous left epididymis and hypervascular  left testicle, may represent acute epididymoorchitis. 3. Enlarged, hypervascular right epididymis may represent epididymitis.     Urology following    HIV  - CD4 count (1/26) 252, viral load (1/26) 5.2 [id number]/mL    Continue with Biktarvy        Plan:   ·  continue with IV zosyn and vancomycin; recommend 2 weeks of abx therapy. Will provide final ABX recommendation after review identification/susceptibility of proteus species from urine  ·  urology following; recommend NSAIDS for orchitis  ·  will adjust ABX prn  ·  fever work up if temp >= 100.4    Above plan of care discussed and agreed with Dr. Luzmaria Benites:   · IV zosyn and vancomycin    Subjective:   Date of Consultation:  April 4, 2022  Referring Physician: Dr. Israel Hernandez    Patient is a 61 y.o. male with medical hx of hep C, HIV, and depression was admitted to the hospital on 4/1 with 3 day hx of  left scrotal pain. No associated fever, chills, nausea, vomiting, chest pain, abd pain, or lower extremity pain. US of scrotum revealed scrotal edema, jayden epididymitis and left orchitis. Pt was evaluated by urologist who indicated that there were small fluctuant area over the inferior left scrotum. I & D was done on 4/2 by Dr. Ilana Valencia at bedside. Wound cx grew MRSA. Urine cx grew proteus. Pt was started on IV zosyn and vancomycin. Pt follows up with Dr. Sadaf Aldana for his HIV, last seen in 2020. He stopped antiretroviral therapy in between changing jobs, but currently back on therapy.  The most recent CD 4/Viral Load 152/ 5.2 [id number]/mL on 1/26/2022. Pt has a follow up visit with Dr. Royal Olivas this month. No recent travel, changes in house (cleaning material, washing detergent etc), and same partner  No hx of alcohol intake, smokes marijuana at times. Pt works as a  for [employer]    ID team was consulted for scrotal abscess evaluation and its treatment  Patient Active Problem List   Diagnosis Code    Cellulitis L03.90    Tenosynovitis M65.9    Orchitis and epididymitis N45.3     Past Medical History:   Diagnosis Date    Hepatitis C     HIV infection ([address]     Psychiatric disorder     depression      Family History   Problem Relation Age of Onset    Hypertension Unknown       Social History     Tobacco Use    Smoking status: Never Smoker    Smokeless tobacco: Not on file   Substance Use Topics    Alcohol use: Yes     Past Surgical History:   Procedure Laterality Date    HX ADENOIDECTOMY        Prior to Admission medications    Medication Sig Start Date End Date Taking? Authorizing Provider   jxuyxqaoz-okuvyooq-bpkygyn ala (BIKTARVY) tab tablet Take 1 Tablet by mouth daily. Yes Provider, Historical   sertraline (ZOLOFT) 100 mg tablet Take 200 mg by mouth daily. Yes Provider, Historical   LORazepam (ATIVAN) 0.5 mg tablet Take 0.5 mg by mouth two (2) times a day. Yes Provider, Historical   dextroamphetamine-amphetamine (ADDERALL) 20 mg tablet Take 20 mg by mouth daily. Yes Provider, Historical   dextroamphetamine-amphetamine (ADDERALL) 20 mg tablet Take 20 mg by mouth daily as needed. Takes in the evening if necessary   Yes Provider, Historical   cetirizine (ZYRTEC) 10 mg tablet Take 10 mg by mouth nightly. Yes Provider, Historical   zolpidem (AMBIEN) 10 mg tablet Take 10 mg by mouth nightly.    Yes Provider, Historical     a  Allergies   Allergen Reactions    Sulfa (Sulfonamide Antibiotics) Hives     Pt states he got hives as a child        REVIEW OF SYSTEMS:     Total of 12 systems reviewed as follows:   I am not able to complete the review of systems because: The patient is intubated and sedated    The patient has altered mental status due to his acute medical problems    The patient has baseline aphasia from prior stroke(s)    The patient has baseline dementia and is not reliable historian                 POSITIVE= underlined text  Negative = text not underlined  General:  fever, chills, sweats, generalized weakness, weight loss/gain,      loss of appetite   Eyes:    blurred vision, eye pain, loss of vision, double vision  ENT:    rhinorrhea, pharyngitis   Respiratory:   cough, sputum production, SOB, wheezing, CASTRO, pleuritic pain   Cardiology:   chest pain, palpitations, orthopnea, PND, edema, syncope   Gastrointestinal:  abdominal pain , N/V, dysphagia, diarrhea, constipation, bleeding   Genitourinary:  frequency, urgency, dysuria, hematuria, incontinence, left scrotal pain  Muskuloskeletal :  arthralgia, myalgia   Hematology:  easy bruising, nose or gum bleeding, lymphadenopathy   Dermatological: rash, ulceration, pruritis   Endocrine:   hot flashes or polydipsia   Neurological:  headache, dizziness, confusion, focal weakness, paresthesia,     Speech difficulties, memory loss, gait disturbance  Psychological: Feelings of anxiety, depression, agitation    Objective:     Visit Vitals  BP (!) 140/92 (BP 1 Location: Left upper arm, BP Patient Position: At rest)   Pulse 72   Temp 97.2 °F (36.2 °C)   Resp 18   Ht 5' 10\" (1.778 m)   Wt 87.6 kg (193 lb 1.6 oz)   SpO2 94%   BMI 27.71 kg/m²     Temp (24hrs), Av.6 °F (36.4 °C), Min:97.2 °F (36.2 °C), Max:97.8 °F (36.6 °C)       Lines:  Peripheral line    PHYSICAL EXAM:   General:    chronically ill appearing. Alert, cooperative, no distress, appears stated age. HEENT: Atraumatic, anicteric sclerae, pink conjunctivae     No oral ulcers, mucosa moist, throat clear  Neck:  Supple, symmetrical,  thyroid: non tender  Lungs:   Clear to auscultation bilaterally.   No Wheezing or Rhonchi. No rales. Chest wall:  No tenderness  No Accessory muscle use. Heart:   Regular  rhythm,  No  murmur   No edema  Abdomen:   Soft, non-tender. Not distended. Bowel sounds normal  Extremities: No cyanosis. No clubbing  Skin:     Not pale. Not Jaundiced  No rashes     Left scrotal incision with scant serous drainage,  Psych:  Good insight. Not depressed. Not anxious or agitated. Neurologic: EOMs intact. No facial asymmetry. No aphasia or slurred speech. Symmetrical strength, Alert and oriented X 4. Data Review:     CBC:  Recent Labs     04/04/22  0333 04/03/22  1213 04/01/22 2006 04/01/22 2006   WBC 6.4 10.2  --  13.1*   GRANS 63 76*   < > 79*   MONOS 7 6   < > 6   EOS 3 2   < > 1   ANEU 4.1 7.8   < > 10.3*   ABL 1.2 1.5   < > 1.7   HGB 13.8 12.3  --  13.1   HCT 41.2 37.2  --  39.4    166  --  164    < > = values in this interval not displayed.        BMP:  Recent Labs     04/04/22  0333 04/03/22  0510 04/01/22 2006   CREA 1.27 0.98 1.23   BUN 17 11 12    137 133*   K 4.3 4.1 3.6    109* 101   CO2 26 25 27   AGAP 5 3* 5   GLU 98 97 134*       LFTS:  Recent Labs     04/01/22 2006   TBILI 1.2*   ALT 13   AP 94   TP 7.5   ALB 4.0       Microbiology:     All Micro Results     Procedure Component Value Units Date/Time    CULTURE, Carolina Fraction STAIN [[id number]]  (Abnormal)  (Susceptibility) Collected: 04/02/22 0053    Order Status: Completed Specimen: Wound from Abscess Updated: 04/04/22 0804     Special Requests: NO SPECIAL REQUESTS        GRAM STAIN FEW WBCS SEEN               2+ GRAM POSITIVE COCCI IN CLUSTERS                  OCCASIONAL GRAM POSITIVE COCCI IN PAIRS           Culture result:       HEAVY * Methicillin Resistant Staphylococcus aureus *          CULTURE, BLOOD, PAIRED [[id number]] Collected: 04/02/22 0053    Order Status: Completed Specimen: Blood Updated: 04/04/22 0639     Special Requests: NO SPECIAL REQUESTS        Culture result: NO GROWTH 2 DAYS       CULTURE, URINE [[id number]]  (Abnormal) Collected: 04/01/22 2248    Order Status: Completed Specimen: Urine from Clean catch Updated: 04/03/22 1548     Special Requests: NO SPECIAL REQUESTS        Force Count --        6000  COLONIES/mL       Culture result:       PROTEUS SPECIES SENSITIVITY TO FOLLOW          CULTURE, WOUND Aurea Vitaliy STAIN [[id number]]     Order Status: Canceled Specimen: Wound from [address] [place] [address] [[id number]] Collected: 04/01/22 2248    Order Status: Completed Specimen: Urine from Serum Updated: 04/01/22 2310     Urine culture hold       Urine on hold in Microbiology dept for 2 days. If unpreserved urine is submitted, it cannot be used for addtional testing after 24 hours, recollection will be required.                 Signed By: Kraig Palm NP     April 4, 2022

## 2022-04-04 NOTE — WOUND CARE
Wound Care Note:     New consult placed by physician request for wound care' seen with Arturo Escobar NP    Chart shows:  Admitted for cellulitis, tenosynovitis and orchitis and epididymitis   Past Medical History:   Diagnosis Date    Hepatitis C     HIV infection ([facility] [address]     Psychiatric disorder     depression     WBC = 6.4 on 4/4/22  Admitted from home    Assessment:   Patient is A&O x 4, communicative, continent with no assistance needed in repositioning. Bed: [place]  Diet: Adult diet regular  Patient pre-medicated for pain by RN. Bilateral heels, buttocks, and sacral skin intact and without erythema. 1. POA left scrotal I & D site measures 0.,5 cm x 1.5 cm x 0.6 cm, wound bed mostly pink, scant amount of slough, wound edges are open, raghu-wound with some edema and erythema. Opticell AG packing strip and Mepitel One applied. Wound care orders obtained from Arturo Escobar NP. Patient repositioned supine. Heels offloaded on pillows. Recommendations:    Scrotum- Daily cleanse with normal saline, wipe wound bed clean and dry loosely fill with Opticell AG packing strip (located on 5E back par room) and cover with Mepitel One. Skin Care & Pressure Prevention:  Minimize layers of linen/pads under patient to optimize support surface. Turn/reposition approximately every 2 hours and offload heels.   Manage incontinence / promote continence   Nourishing Skin Cream to dry skin, minimize use of briefs when able    Discussed above plan with patient & Rubina Lee RN    Transition of Care: Plan to follow as needed while admitted to hospital.    ALEXANDRA YuN, RN, [facility], [facility].  office [phone]  pager [phone] or call  to page

## 2022-04-04 NOTE — PROGRESS NOTES
TRANSFER - OUT REPORT:    Verbal report given to NUZHAT Alvarado(name) on Jose Garcia  being transferred to (unit) for routine progression of care       Report consisted of patients Situation, Background, Assessment and   Recommendations(SBAR). Information from the following report(s) SBAR, Kardex, Procedure Summary, MAR and Recent Results was reviewed with the receiving nurse. Lines:   Peripheral IV 04/03/22 Anterior;Right Forearm (Active)   Site Assessment Clean, dry, & intact 04/03/22 1330   Phlebitis Assessment 0 04/03/22 1330   Infiltration Assessment 0 04/03/22 1330   Dressing Status Clean, dry, & intact 04/03/22 1330   Dressing Type Transparent 04/03/22 1330   Hub Color/Line Status Blue;Patent; Flushed;End cap changed 04/03/22 1330   Action Taken Open ports on tubing capped 04/03/22 1330   Alcohol Cap Used Yes 04/03/22 1330        Opportunity for questions and clarification was provided.       Patient transported with:   Registered Nurse

## 2022-04-04 NOTE — PROGRESS NOTES
7563 Bedside report given to Sofy Holley RN by Brigette Marrero RN. Report included SBAR, ED Report, Labs, and Cardiac Rhythm N/A. Patient in bed resting well. Vitals are stable.

## 2022-04-04 NOTE — PROGRESS NOTES
Patient: Stefani Meeks MRN: [id number]  SSN: [id number]    YOB: 1965  Age: 61 y.o. Sex: male        ADMITTED: 2022 to frents* by Shyanne Singh DO for Orchitis and epididymitis [N45.3]    F/U left scrotal wound S/P I&D  by Dr. Joseph Corado. He is doing well. States that his pain is improved after I&D but , severity 8/10. Denies fevers, chills, dysuria. Patient premedicated before wound care. Care coordinated with Wound Care RN. AFVSS. WBC wnl. UCx +proteus. Wound Cx +MRSA. +Zosyn, Vanc. ID consulted. Vitals: Temp (24hrs), Av.5 °F (36.4 °C), Min:97.2 °F (36.2 °C), Max:97.8 °F (36.6 °C)    Blood pressure (!) 140/92, pulse 72, temperature 97.2 °F (36.2 °C), resp. rate 18, height 5' 10\" (1.778 m), weight 87.6 kg (193 lb 1.6 oz), SpO2 94 %. Intake and Output:   1901 -  0700  In: 300 [P.O.:300]  Out: 100 [Urine:100]  No intake/output data recorded. CY Output lats 24 hrs: No data found. CY Output last 8 hrs: No data found. BM over last 24 hrs: No data found. Physical Exam  General: NAD, pleasant  Respiratory: no distress, breathing easily, room air  Abdomen: soft, no distention; non-tender to palpation  : no CVA tenderness, voiding independently. Left scrotal incision with scant serous drainage, wound packing out, shallow w/o tunneling, minimal surrounding erythema, TTP. No crepitus or necrosis.    Neuro: Appropriate, no focal neurological deficits  Skin: warm, dry  Extremities: moves all, full ROM    Labs:  CBC:   Lab Results   Component Value Date/Time    WBC 6.4 2022 03:33 AM    HCT 41.2 2022 03:33 AM    PLATELET 685  03:33 AM     BMP:   Lab Results   Component Value Date/Time    Glucose 139 (H) 2022 10:52 AM    Sodium 137 2022 10:52 AM    Potassium 4.0 2022 10:52 AM    Chloride 107 2022 10:52 AM    CO2 29 2022 10:52 AM    BUN 16 2022 10:52 AM    Creatinine 1.24 2022 10:52 AM    Calcium 8.8 04/04/2022 10:52 AM     Assessment/Plan:   Cellulitis  Orchitis and epididymitis    S/P I&D 4/2. Improving. Continue abx. Awaiting sensitivities to UCx. ID consulted. NSAIDS to reduce orchitis.     Daily wound care   OOB     Discussed with Dr. Sarah Spangler  Signed By: Kristine Hernandes NP - April 4, 2022

## 2022-04-05 VITALS
WEIGHT: 193.1 LBS | DIASTOLIC BLOOD PRESSURE: 80 MMHG | TEMPERATURE: 97.4 F | HEART RATE: 69 BPM | SYSTOLIC BLOOD PRESSURE: 136 MMHG | BODY MASS INDEX: 27.64 KG/M2 | OXYGEN SATURATION: 95 % | RESPIRATION RATE: 18 BRPM | HEIGHT: 70 IN

## 2022-04-05 PROBLEM — N45.3 ORCHITIS AND EPIDIDYMITIS: Status: RESOLVED | Noted: 2022-04-02 | Resolved: 2022-04-05

## 2022-04-05 PROBLEM — L03.90 CELLULITIS: Status: RESOLVED | Noted: 2020-02-28 | Resolved: 2022-04-05

## 2022-04-05 LAB
ANION GAP SERPL CALC-SCNC: 5 MMOL/L (ref 5–15)
BACTERIA SPEC CULT: ABNORMAL
BASOPHILS # BLD AUTO: 0 X10E3/UL (ref 0–0.2)
BASOPHILS # BLD: 0.1 K/UL (ref 0–0.1)
BASOPHILS NFR BLD AUTO: 0 %
BASOPHILS NFR BLD: 1 % (ref 0–1)
BUN SERPL-MCNC: 16 MG/DL (ref 6–20)
BUN/CREAT SERPL: 14 (ref 12–20)
CALCIUM SERPL-MCNC: 8.7 MG/DL (ref 8.5–10.1)
CC UR VC: ABNORMAL
CD3+CD4+ CELLS # BLD: 384 /UL (ref 359–1519)
CD3+CD4+ CELLS NFR BLD: 29.5 % (ref 30.8–58.5)
CHLORIDE SERPL-SCNC: 106 MMOL/L (ref 97–108)
CO2 SERPL-SCNC: 24 MMOL/L (ref 21–32)
CREAT SERPL-MCNC: 1.13 MG/DL (ref 0.7–1.3)
DIFFERENTIAL METHOD BLD: ABNORMAL
EOSINOPHIL # BLD AUTO: 0.2 X10E3/UL (ref 0–0.4)
EOSINOPHIL # BLD: 0.3 K/UL (ref 0–0.4)
EOSINOPHIL NFR BLD AUTO: 4 %
EOSINOPHIL NFR BLD: 4 % (ref 0–7)
ERYTHROCYTE [DISTWIDTH] IN BLOOD BY AUTOMATED COUNT: 12.7 % (ref 11.6–15.4)
ERYTHROCYTE [DISTWIDTH] IN BLOOD BY AUTOMATED COUNT: 13.6 % (ref 11.5–14.5)
GLUCOSE SERPL-MCNC: 99 MG/DL (ref 65–100)
HCT VFR BLD AUTO: 37.9 % (ref 36.6–50.3)
HCT VFR BLD AUTO: 38.1 % (ref 37.5–51)
HGB BLD-MCNC: 12.9 G/DL (ref 12.1–17)
HGB BLD-MCNC: 13.3 G/DL (ref 13–17.7)
HIV1 RNA # SERPL NAA+PROBE: <20 COPIES/ML
HIV1 RNA SERPL NAA+PROBE-LOG#: NORMAL LOG10COPY/ML
IMM GRANULOCYTES # BLD AUTO: 0 K/UL
IMM GRANULOCYTES NFR BLD AUTO: 0 %
IMMATURE CELLS, 115398: ABNORMAL
LYMPHOCYTES # BLD AUTO: 1.3 X10E3/UL (ref 0.7–3.1)
LYMPHOCYTES # BLD: 1.2 K/UL (ref 0.8–3.5)
LYMPHOCYTES NFR BLD AUTO: 26 %
LYMPHOCYTES NFR BLD: 17 % (ref 12–49)
MAGNESIUM SERPL-MCNC: 2.6 MG/DL (ref 1.6–2.4)
MCH RBC QN AUTO: 31.1 PG (ref 26.6–33)
MCH RBC QN AUTO: 31.1 PG (ref 26–34)
MCHC RBC AUTO-ENTMCNC: 34 G/DL (ref 30–36.5)
MCHC RBC AUTO-ENTMCNC: 34.9 G/DL (ref 31.5–35.7)
MCV RBC AUTO: 89 FL (ref 79–97)
MCV RBC AUTO: 91.3 FL (ref 80–99)
METAMYELOCYTES, RIM1T: 1 % (ref 0–0)
MONOCYTES # BLD AUTO: 0.3 X10E3/UL (ref 0.1–0.9)
MONOCYTES # BLD: 1 K/UL (ref 0–1)
MONOCYTES NFR BLD AUTO: 5 %
MONOCYTES NFR BLD: 14 % (ref 5–13)
MORPHOLOGY BLD-IMP: ABNORMAL
MYELOCYTES NFR BLD MANUAL: 1 %
MYELOCYTES, RIM2T: 1 % (ref 0–0)
NEUTROPHILS # BLD AUTO: 3.2 X10E3/UL (ref 1.4–7)
NEUTROPHILS NFR BLD AUTO: 63 %
NEUTS SEG # BLD: 4.3 K/UL (ref 1.8–8)
NEUTS SEG NFR BLD: 63 % (ref 32–75)
NRBC # BLD: 0 K/UL (ref 0–0.01)
NRBC BLD-RTO: 0 PER 100 WBC
PHOSPHATE SERPL-MCNC: 3.9 MG/DL (ref 2.6–4.7)
PLATELET # BLD AUTO: 204 K/UL (ref 150–400)
PLATELET # BLD AUTO: 216 X10E3/UL (ref 150–450)
PMV BLD AUTO: 8.2 FL (ref 8.9–12.9)
POTASSIUM SERPL-SCNC: 4.1 MMOL/L (ref 3.5–5.1)
RBC # BLD AUTO: 4.15 M/UL (ref 4.1–5.7)
RBC # BLD AUTO: 4.27 X10E6/UL (ref 4.14–5.8)
RBC MORPH BLD: ABNORMAL
SERVICE CMNT-IMP: ABNORMAL
SODIUM SERPL-SCNC: 135 MMOL/L (ref 136–145)
VANCOMYCIN SERPL-MCNC: 10.2 UG/ML
WBC # BLD AUTO: 5.1 X10E3/UL (ref 3.4–10.8)
WBC # BLD AUTO: 6.9 K/UL (ref 4.1–11.1)

## 2022-04-05 PROCEDURE — 74011250637 HC RX REV CODE- 250/637: Performed by: STUDENT IN AN ORGANIZED HEALTH CARE EDUCATION/TRAINING PROGRAM

## 2022-04-05 PROCEDURE — 74011250636 HC RX REV CODE- 250/636: Performed by: INTERNAL MEDICINE

## 2022-04-05 PROCEDURE — 84100 ASSAY OF PHOSPHORUS: CPT

## 2022-04-05 PROCEDURE — 80048 BASIC METABOLIC PNL TOTAL CA: CPT

## 2022-04-05 PROCEDURE — 74011250636 HC RX REV CODE- 250/636: Performed by: STUDENT IN AN ORGANIZED HEALTH CARE EDUCATION/TRAINING PROGRAM

## 2022-04-05 PROCEDURE — 36415 COLL VENOUS BLD VENIPUNCTURE: CPT

## 2022-04-05 PROCEDURE — 83735 ASSAY OF MAGNESIUM: CPT

## 2022-04-05 PROCEDURE — 80202 ASSAY OF VANCOMYCIN: CPT

## 2022-04-05 PROCEDURE — 74011250637 HC RX REV CODE- 250/637: Performed by: NURSE PRACTITIONER

## 2022-04-05 PROCEDURE — 74011250637 HC RX REV CODE- 250/637: Performed by: INTERNAL MEDICINE

## 2022-04-05 PROCEDURE — 74011250636 HC RX REV CODE- 250/636: Performed by: HOSPITALIST

## 2022-04-05 PROCEDURE — 85025 COMPLETE CBC W/AUTO DIFF WBC: CPT

## 2022-04-05 PROCEDURE — 74011000258 HC RX REV CODE- 258: Performed by: STUDENT IN AN ORGANIZED HEALTH CARE EDUCATION/TRAINING PROGRAM

## 2022-04-05 RX ORDER — DOXYCYCLINE HYCLATE 100 MG
100 TABLET ORAL EVERY 12 HOURS
Status: DISCONTINUED | OUTPATIENT
Start: 2022-04-05 | End: 2022-04-05 | Stop reason: HOSPADM

## 2022-04-05 RX ORDER — AMOXICILLIN AND CLAVULANATE POTASSIUM 875; 125 MG/1; MG/1
1 TABLET, FILM COATED ORAL 2 TIMES DAILY WITH MEALS
Status: DISCONTINUED | OUTPATIENT
Start: 2022-04-05 | End: 2022-04-05 | Stop reason: HOSPADM

## 2022-04-05 RX ORDER — VANCOMYCIN/0.9 % SOD CHLORIDE 1 G/100 ML
1000 PLASTIC BAG, INJECTION (ML) INTRAVENOUS EVERY 12 HOURS
Status: DISCONTINUED | OUTPATIENT
Start: 2022-04-05 | End: 2022-04-05

## 2022-04-05 RX ORDER — CLONIDINE HYDROCHLORIDE 0.2 MG/1
0.1 TABLET ORAL
Status: DISCONTINUED | OUTPATIENT
Start: 2022-04-05 | End: 2022-04-05 | Stop reason: HOSPADM

## 2022-04-05 RX ORDER — DOXYCYCLINE HYCLATE 100 MG
100 TABLET ORAL EVERY 12 HOURS
Qty: 19 TABLET | Refills: 0 | Status: SHIPPED | OUTPATIENT
Start: 2022-04-05 | End: 2022-04-15

## 2022-04-05 RX ORDER — AMOXICILLIN AND CLAVULANATE POTASSIUM 875; 125 MG/1; MG/1
1 TABLET, FILM COATED ORAL 2 TIMES DAILY WITH MEALS
Qty: 20 TABLET | Refills: 0 | Status: SHIPPED | OUTPATIENT
Start: 2022-04-05 | End: 2022-04-15

## 2022-04-05 RX ADMIN — AMOXICILLIN AND CLAVULANATE POTASSIUM 1 TABLET: 875; 125 TABLET, FILM COATED ORAL at 17:48

## 2022-04-05 RX ADMIN — LORAZEPAM 0.5 MG: 1 TABLET ORAL at 17:48

## 2022-04-05 RX ADMIN — LORAZEPAM 0.5 MG: 1 TABLET ORAL at 09:04

## 2022-04-05 RX ADMIN — DOXYCYCLINE HYCLATE 100 MG: 100 TABLET, COATED ORAL at 13:31

## 2022-04-05 RX ADMIN — OXYCODONE 5 MG: 5 TABLET ORAL at 09:11

## 2022-04-05 RX ADMIN — VANCOMYCIN HYDROCHLORIDE 1250 MG: 1.25 INJECTION, POWDER, LYOPHILIZED, FOR SOLUTION INTRAVENOUS at 06:27

## 2022-04-05 RX ADMIN — SERTRALINE 200 MG: 50 TABLET, FILM COATED ORAL at 09:04

## 2022-04-05 RX ADMIN — PIPERACILLIN SODIUM AND TAZOBACTAM SODIUM 3.38 G: 3; 375 INJECTION, POWDER, FOR SOLUTION INTRAVENOUS at 06:27

## 2022-04-05 RX ADMIN — BICTEGRAVIR SODIUM, EMTRICITABINE, AND TENOFOVIR ALAFENAMIDE FUMARATE 1 TABLET: 50; 200; 25 TABLET ORAL at 09:04

## 2022-04-05 RX ADMIN — ENOXAPARIN SODIUM 40 MG: 100 INJECTION SUBCUTANEOUS at 09:05

## 2022-04-05 NOTE — PROGRESS NOTES
ITZEL:  RUR: 5%    Disposition:  Home with Home Health/SN    CM received notice of plans for patient d/c today. Home Health SN orders received and referral sent to [facility] for wound care every other day with silver  Cream and supplies. CM met with patient and freedom of choice offered. Transition of Care Plan:     The Plan for Transition of Care is related to the following treatment goals: Home with [facility]    The Patient  was provided with a choice of provider and agrees  with the discharge plan. Yes [x] No []    A Freedom of choice list was provided with basic dialogue that supports the patient's individualized plan of care/goals and shares the quality data associated with the providers. Yes [x] No []    [facility] has accepted patient for servicing. Start of Care date will be Thursday 4/7/22. Agency asks that hospital send home all dressing supplies used here. Request passed on to \[facility]\"".   Updates placed on AVS.    Jackie Ramirez, [address], [address]

## 2022-04-05 NOTE — PROGRESS NOTES
[address] April 5, 2022       RE: Hoang Coe      To Whom It May Concern,    This is to certify that Hoang Coe was hospitalized from 4/1/22 to 4/5/22. Patient may return to work if feeling well and cleared by his PCP in 1 to 2 weeks. Please feel free to contact my office if you have any questions or concerns. Thank you for your assistance in this matter.       Sincerely,  Joe Lawton MD   [phone]

## 2022-04-05 NOTE — DISCHARGE INSTRUCTIONS
Diet cardiac  Activity as tolerated  Continue current local wound care as well surgery team  Return to ER or call 911 if symptoms reoccur or get worse  Check CBC & BMP at PCPs office next visit

## 2022-04-05 NOTE — PROGRESS NOTES
Problem: Cellulitis Care Plan (Adult)  Goal: *Control of acute pain  Outcome: Progressing Towards Goal  Goal: *Skin integrity maintained  Outcome: Progressing Towards Goal  Goal: *Absence of infection signs and symptoms  Outcome: Progressing Towards Goal     Problem: Patient Education: Go to Patient Education Activity  Goal: Patient/Family Education  Outcome: Progressing Towards Goal     Problem: Falls - Risk of  Goal: *Absence of Falls  Description: Document Radha Fall Risk and appropriate interventions in the flowsheet. Outcome: Progressing Towards Goal  Note: Fall Risk Interventions:            Medication Interventions: Assess postural VS orthostatic hypotension,Evaluate medications/consider consulting pharmacy         History of Falls Interventions: Evaluate medications/consider consulting pharmacy         Problem: Patient Education: Go to Patient Education Activity  Goal: Patient/Family Education  Outcome: Progressing Towards Goal     Problem: General Infection Care Plan (Adult and Pediatric)  Goal: Improvement in signs and symptoms of infection  Outcome: Progressing Towards Goal  Goal: *Optimize nutritional status  Outcome: Progressing Towards Goal     Problem: Patient Education: Go to Patient Education Activity  Goal: Patient/Family Education  Outcome: Progressing Towards Goal     Problem: Discharge Planning  Goal: *Discharge to safe environment  Outcome: Progressing Towards Goal     Problem: Risk for Spread of Infection  Goal: Prevent transmission of infectious organism to others  Description: Prevent the transmission of infectious organisms to other patients, staff members, and visitors.   Outcome: Progressing Towards Goal     Problem: Patient Education:  Go to Education Activity  Goal: Patient/Family Education  Outcome: Progressing Towards Goal

## 2022-04-05 NOTE — PROGRESS NOTES
Pt is sitting at the side of the bed with even and unlabored respirations on room air. No complaints of pain at this time. No distress noted. AVS was given to the pt and explained. All questions were answered. Pt was given a work note. IV was removed with little to no bleeding noted. Pt had wound care come and change the dressing to his scrotum. A bag of wound care supplies was sent home with the pt. Belongings were collected at bedside and returned to the pt. Pt states he came to the unit with a blue iphone 12 and it has been lost during the visit. Pt stated other RN's had looked for it yesterday and could not locate it. All safety precautions are in place. Pts roommate is driving the pt home. Problem: Cellulitis Care Plan (Adult)  Goal: *Control of acute pain  Outcome: Resolved/Met  Goal: *Skin integrity maintained  Outcome: Resolved/Met  Goal: *Absence of infection signs and symptoms  Outcome: Resolved/Met     Problem: Patient Education: Go to Patient Education Activity  Goal: Patient/Family Education  Outcome: Resolved/Met     Problem: Falls - Risk of  Goal: *Absence of Falls  Description: Document Radha Fall Risk and appropriate interventions in the flowsheet.   Outcome: Resolved/Met  Note: Fall Risk Interventions:            Medication Interventions: Evaluate medications/consider consulting pharmacy         History of Falls Interventions: Evaluate medications/consider consulting pharmacy,Door open when patient unattended         Problem: Patient Education: Go to Patient Education Activity  Goal: Patient/Family Education  Outcome: Resolved/Met     Problem: General Infection Care Plan (Adult and Pediatric)  Goal: Improvement in signs and symptoms of infection  Outcome: Resolved/Met  Goal: *Optimize nutritional status  Outcome: Resolved/Met     Problem: Patient Education: Go to Patient Education Activity  Goal: Patient/Family Education  Outcome: Resolved/Met     Problem: Discharge Planning  Goal: *Discharge to safe environment  Outcome: Resolved/Met     Problem: Risk for Spread of Infection  Goal: Prevent transmission of infectious organism to others  Description: Prevent the transmission of infectious organisms to other patients, staff members, and visitors.   Outcome: Resolved/Met     Problem: Patient Education:  Go to Education Activity  Goal: Patient/Family Education  Outcome: Resolved/Met

## 2022-04-05 NOTE — PROGRESS NOTES
[address] [facility] Adult  Hospitalist Group                                                                                          Hospitalist Progress Note  Joe Davis MD  Answering service: [phone] from in house phone        Date of Service:  2022  NAME:  Marci Villafana  :  1965  MRN:  [id number]      Admission Summary:   61 y. o. male presents with 3 days duration of abrupt onset, gradually worsening, severe, constant, nonradiating, sharp, scrotal pain that is associated with subjective fevers.  Patient was admitted for scrotal abscess. Interval history / Subjective:   Patient seen and examined, chart was reviewed. Patient comfortable in bed without any acute complaints to me at this time. Sensitivities pending       Assessment & Plan:     Scrotal abscess with suprapubic cellulitis; POA; s/p I & D  Orchitis/Epididymitis  - s/p I and D by urology on , following  - Wound culture growing MRSA    - Continue vancomycin and zosyn for now per ID   - Pending urine sensitivities on proteus. - Wound care consulted     HIV; On Biktarvy, f/u ID as OP  H/o HCV: Patient has achieved SVR. HCV RNA negative in 2021  Depression/anxiety - home zoloft, ativan   Elevated BP w/o HTN- prn clonidien while here. F/u PCP as OP     Code status: FULL  Prophylaxis: lovenox  Anticipated Disposition: Home in 24-48 hours, when cleared by ID and urology             Vital Signs:    Last 24hrs VS reviewed since prior progress note.  Most recent are:  Visit Vitals  BP (!) 164/99 (BP 1 Location: Left arm, BP Patient Position: Sitting)   Pulse 73   Temp 97.9 °F (36.6 °C)   Resp 19   Ht 5' 10\" (1.778 m)   Wt 87.6 kg (193 lb 1.6 oz)   SpO2 99%   BMI 27.71 kg/m²         Intake/Output Summary (Last 24 hours) at 2022 0946  Last data filed at 2022 1827  Gross per 24 hour   Intake 600 ml   Output --   Net 600 ml        Physical Examination:     I had a face to face encounter with this patient and independently examined them on 4/5/2022 as outlined below:          Constitutional:  No acute distress, cooperative, pleasant    ENT:  Oral mucosa moist.    Resp:    No accessory muscle use. CV:  Regular rhythm, normal rate,      GI:  Soft, non distended,     Musculoskeletal:  No edema       Neurologic:  Moves all extremities. AAOx3,                 Labs:     Recent Labs     04/05/22  0622 04/04/22  0333   WBC 6.9 6.4   HGB 12.9 13.8   HCT 37.9 41.2    185     Recent Labs     04/05/22  0352 04/04/22  1052 04/04/22  0333   * 137 138   K 4.1 4.0 4.3    107 107   CO2 24 29 26   BUN 16 16 17   CREA 1.13 1.24 1.27   GLU 99 139* 98   CA 8.7 8.8 8.9   MG 2.6*  --   --    PHOS 3.9  --   --      No results for input(s): ALT, AP, TBIL, TBILI, TP, ALB, GLOB, GGT, AML, LPSE in the last 72 hours. No lab exists for component: SGOT, GPT, AMYP, HLPSE  No results for input(s): INR, PTP, APTT, INREXT, INREXT in the last 72 hours. No results for input(s): FE, TIBC, PSAT, FERR in the last 72 hours. No results found for: FOL, RBCF   No results for input(s): PH, PCO2, PO2 in the last 72 hours. No results for input(s): CPK, CKNDX, TROIQ in the last 72 hours.     No lab exists for component: CPKMB  No results found for: CHOL, CHOLX, CHLST, CHOLV, HDL, HDLP, LDL, LDLC, DLDLP, TGLX, TRIGL, TRIGP, CHHD, CHHDX  Lab Results   Component Value Date/Time    Glucose (POC) 96 03/01/2020 06:58 AM    Glucose (POC) 129 (H) 02/29/2020 09:39 PM     Lab Results   Component Value Date/Time    Color YELLOW/STRAW 04/01/2022 10:48 PM    Appearance CLEAR 04/01/2022 10:48 PM    Specific gravity 1.028 04/01/2022 10:48 PM    pH (UA) 6.0 04/01/2022 10:48 PM    Protein 30 (A) 04/01/2022 10:48 PM    Glucose Negative 04/01/2022 10:48 PM    Ketone Negative 04/01/2022 10:48 PM    Bilirubin Negative 04/01/2022 10:48 PM    Urobilinogen 2.0 (H) 04/01/2022 10:48 PM    Nitrites Negative 04/01/2022 10:48 PM    Leukocyte Esterase Negative 04/01/2022 10:48 PM    Epithelial cells FEW 04/01/2022 10:48 PM    Bacteria Negative 04/01/2022 10:48 PM    WBC 0-4 04/01/2022 10:48 PM    RBC 0-5 04/01/2022 10:48 PM         Medications Reviewed:     Current Facility-Administered Medications   Medication Dose Route Frequency    vancomycin (VANCOCIN) 1000 mg in  ml infusion  1,000 mg IntraVENous Q12H    Vancomycin - Pharmacy to Dose   Other Rx Dosing/Monitoring    oxyCODONE IR (ROXICODONE) tablet 5 mg  5 mg Oral Q4H PRN    acetaminophen (TYLENOL) tablet 650 mg  650 mg Oral Q4H PRN    traMADoL (ULTRAM) tablet 50 mg  50 mg Oral Q8H PRN    sertraline (ZOLOFT) tablet 200 mg  200 mg Oral DAILY    LORazepam (ATIVAN) tablet 0.5 mg  0.5 mg Oral BID    cetirizine (ZYRTEC) tablet 10 mg  10 mg Oral QHS    zolpidem (AMBIEN) tablet 10 mg  10 mg Oral QHS    piperacillin-tazobactam (ZOSYN) 3.375 g in 0.9% sodium chloride (MBP/ADV) 100 mL MBP  3.375 g IntraVENous Q8H    enoxaparin (LOVENOX) injection 40 mg  40 mg SubCUTAneous Q24H    xbuankfbj-sfqqriko-kvygvoc ala (BIKTARVY) -25 mg tablet 1 Tablet  1 Tablet Oral DAILY     ______________________________________________________________________  EXPECTED LENGTH OF STAY: 2d 4h  ACTUAL LENGTH OF STAY:          3                 Joe Skelton MD

## 2022-04-05 NOTE — PROGRESS NOTES
Patient: Stefani Meeks MRN: [id number]  SSN: [id number]    YOB: 1965  Age: 61 y.o. Sex: male        ADMITTED: 2022 to Joe Duenas MD by Shyanne Singh DO for Orchitis and epididymitis [N45.3]  F/U left scrotal wound S/P I&D  by Dr. Joseph Corado. He is doing well. His pain is better. Denies fevers, chills, dysuria. Patient premedicated before wound care. Care coordinated with Wound Care RN. ID also present for exam.       AFVSS. WBC wnl. UCx +proteus. Wound Cx +MRSA. +Zosyn, Vanc. ID following, rec 2 weeks of abx. Vitals: Temp (24hrs), Av.7 °F (36.5 °C), Min:97.2 °F (36.2 °C), Max:98.1 °F (36.7 °C)    Blood pressure (!) 164/99, pulse 73, temperature 97.9 °F (36.6 °C), resp. rate 19, height 5' 10\" (1.778 m), weight 87.6 kg (193 lb 1.6 oz), SpO2 99 %. Intake and Output:   1901 -  0700  In: 900 [P.O.:900]  Out: -   No intake/output data recorded. CY Output lats 24 hrs: No data found. CY Output last 8 hrs: No data found. BM over last 24 hrs: No data found. Physical Exam  General: NAD, pleasant  Respiratory: no distress, breathing easily, room air  Abdomen: soft, no distention; non-tender to palpation  : no CVA tenderness, voiding independently. Left scrotal incision with scant serous drainage, wound packing out, shallow w/o tunneling, wound base pink, minimal surrounding erythema- improved, TTP. No crepitus or necrosis.    Neuro: Appropriate, no focal neurological deficits  Skin: warm, dry  Extremities: moves all, full ROM    Labs:  CBC:   Lab Results   Component Value Date/Time    WBC 6.9 2022 06:22 AM    HCT 37.9 2022 06:22 AM    PLATELET 046 15/52/8919 06:22 AM     BMP:   Lab Results   Component Value Date/Time    Glucose 99 2022 03:52 AM    Sodium 135 (L) 2022 03:52 AM    Potassium 4.1 2022 03:52 AM    Chloride 106 2022 03:52 AM    CO2 24 2022 03:52 AM    BUN 16 2022 03:52 AM    Creatinine 1.13 2022 03:52 AM Calcium 8.7 04/05/2022 03:52 AM     Assessment/Plan:   Cellulitis  Orchitis and epididymitis     S/P I&D 4/2. Improving. Continue abx. Awaiting sensitivities to UCx. ID following. NSAIDS to reduce orchitis. Daily wound care/ teaching for home dressing changes vs HH.    OOB      Discussed with Dr. Nair  Signed By: Chica Garcia NP - April 5, 2022

## 2022-04-05 NOTE — PROGRESS NOTES
ID Progress Note  2022    Subjective:     Left scrotal pain    Review of Systems:            Symptom Y/N Comments   Symptom Y/N Comments   Fever/Chills n      Chest Pain n       Poor Appetite       Edema        Cough       Abdominal Pain n       Sputum       Joint Pain        SOB/CASTRO  n     Pruritis/Rash        Nausea/vomit  n     Tolerating PT/OT        Diarrhea  n     Tolerating Diet        Constipation  n     Other y   scrotal pain       Could NOT obtain due to:       Objective:     Vitals:   Visit Vitals  BP (!) 164/99 (BP 1 Location: Left arm, BP Patient Position: Sitting)   Pulse 73   Temp 97.9 °F (36.6 °C)   Resp 19   Ht 5' 10\" (1.778 m)   Wt 87.6 kg (193 lb 1.6 oz)   SpO2 99%   BMI 27.71 kg/m²        Tmax:  Temp (24hrs), Av.9 °F (36.6 °C), Min:97.4 °F (36.3 °C), Max:98.1 °F (36.7 °C)      PHYSICAL EXAM:  General: Chronically ill appearing. WD, WN. Alert, cooperative, no acute distress    EENT:  EOMI. Anicteric sclerae. MMM  Resp:  CTA bilaterally, no wheezing or rales. No accessory muscle use  CV:  Regular  rhythm,  No edema  GI:  Soft, Non distended, Non tender. +Bowel sounds  Neurologic:  Alert and oriented X 3, normal speech,   Psych:   Good insight. Not anxious nor agitated  Skin:  No rashes.   No jaundice    Left scrotum; scant serous drainage, wound packing out, shallow w/o tunneling, wound base pink, minimal surrounding erythema; wound examined with urology and wound care team  Labs:   Lab Results   Component Value Date/Time    WBC 6.9 2022 06:22 AM    HGB 12.9 2022 06:22 AM    HCT 37.9 2022 06:22 AM    PLATELET 917  06:22 AM    MCV 91.3 2022 06:22 AM     Lab Results   Component Value Date/Time    Sodium 135 (L) 2022 03:52 AM    Potassium 4.1 2022 03:52 AM    Chloride 106 2022 03:52 AM    CO2 24 2022 03:52 AM    Anion gap 5 2022 03:52 AM    Glucose 99 2022 03:52 AM    BUN 16 2022 03:52 AM    Creatinine 1.13 2022 03:52 AM    BUN/Creatinine ratio 14 04/05/2022 03:52 AM    GFR est AA >60 04/05/2022 03:52 AM    GFR est non-AA >60 04/05/2022 03:52 AM    Calcium 8.7 04/05/2022 03:52 AM    Bilirubin, total 1.2 (H) 04/01/2022 08:06 PM    Alk. phosphatase 94 04/01/2022 08:06 PM    Protein, total 7.5 04/01/2022 08:06 PM    Albumin 4.0 04/01/2022 08:06 PM    Globulin 3.5 04/01/2022 08:06 PM    A-G Ratio 1.1 04/01/2022 08:06 PM    ALT (SGPT) 13 04/01/2022 08:06 PM         Cultures:   Results     Procedure Component Value Units Date/Time    CULTURE, BLOOD, PAIRED [[id number]] Collected: 04/02/22 0053    Order Status: Completed Specimen: Blood Updated: 04/05/22 0734     Special Requests: NO SPECIAL REQUESTS        Culture result: NO GROWTH 3 DAYS       CULTURE, WOUND Aundria Nicholas STAIN [[id number]]  (Abnormal)  (Susceptibility) Collected: 04/02/22 0053    Order Status: Completed Specimen: Wound from Abscess Updated: 04/04/22 0804     Special Requests: NO SPECIAL REQUESTS        GRAM STAIN FEW WBCS SEEN               2+ GRAM POSITIVE COCCI IN CLUSTERS                  OCCASIONAL GRAM POSITIVE COCCI IN PAIRS           Culture result:       HEAVY * Methicillin Resistant Staphylococcus aureus *          Susceptibility      Staphylococcus aureus Methcillin Resistant     ROZ     Ciprofloxacin ($) Susceptible     Clindamycin ($) Susceptible     Daptomycin ($$$$$) Susceptible     Doxycycline ($$) Susceptible     Erythromycin ($$$$) Resistant     Gentamicin ($) Susceptible     Levofloxacin ($) Susceptible     Linezolid ($$$$$) Susceptible     Oxacillin Resistant     Rifampin ($$$$) Susceptible  [1]      Tetracycline Susceptible     Trimeth/Sulfa Susceptible     Vancomycin ($) Susceptible                 [1]  Rifampin is not to be used for mono-therapy.           Linear View                   CULTURE, Nadia Steeler STAIN [[id number]]     Order Status: Canceled Specimen: Wound from [address] [place] [address] [[id number]] Collected: 04/01/22 7160 Order Status: Completed Specimen: Urine from Serum Updated: 04/01/22 2310     Urine culture hold       Urine on hold in Microbiology dept for 2 days. If unpreserved urine is submitted, it cannot be used for addtional testing after 24 hours, recollection will be required. CULTURE, URINE [[id number]]  (Abnormal)  (Susceptibility) Collected: 04/01/22 1861    Order Status: Completed Specimen: Urine from Clean catch Updated: 04/05/22 0901     Special Requests: NO SPECIAL REQUESTS        Wiggins Count --        6000  COLONIES/mL       Culture result: PROTEUS MIRABILIS       Susceptibility      Proteus mirabilis     ROZ     Amikacin ($) Susceptible     Ampicillin ($) Susceptible     Ampicillin/sulbactam ($) Susceptible     Cefazolin ($) Susceptible     Cefepime ($$) Susceptible     Cefoxitin Susceptible     Ceftazidime ($) Susceptible     Ceftriaxone ($) Susceptible     Ciprofloxacin ($) Susceptible     Gentamicin ($) Susceptible     Levofloxacin ($) Susceptible     Meropenem ($$) Susceptible     Nitrofurantoin Resistant     Piperacillin/Tazobac ($) Susceptible     Tobramycin ($) Susceptible     Trimeth/Sulfa Resistant                  Linear View                        US of scrotum (4/1)  1. Diffuse scrotal edema. Small, septated left hydrocele. 2. Enlarged, hypervascular and heterogeneous left epididymis and hypervascular  left testicle, may represent acute epididymoorchitis. 3. Enlarged, hypervascular right epididymis may represent epididymitis.     Urology following      Assessment and Plan   Orchitis  Scrotal abscess  Scrotal cellulitis  S/p I & D (4/2)  Hep C  - afebrile, WBC 6.4    Urine cx (4/1) proteus species    Wound cx (4/2) mrsa     urology following; recommend NSAIDS for orchitis      Received IV zosyn and vancomycin. Recommend to complete 10 more days of PO Augmentin and doxycycline upon discharge.      Pt should follow up with urology team as outpatient    HIV  - CD4 count (1/26) 252, viral load (1/26) 5.2 [id number]/mL    Continue with Biktarvy    Pt has a follow up visit with Dr. Kaela Nuno this month. Keep the appointment as is        Above plan of care discussed and agreed with Dr. Kaela Nuno     ID team signing off. Please contact us with any questions.         Hyunsun Thirza Riedel, NP

## 2022-04-05 NOTE — WOUND CARE
Wound Care Note:     Follow-up visit for scrotal I & D site; seen with Timmy Louie NP    Chart shows:  Admitted for cellulitis, tenosynovitis and orchitis and epididymitis   Past Medical History:   Diagnosis Date    Hepatitis C     HIV infection ([facility] [address]     Psychiatric disorder     depression     WBC = 6.9 on 4/5/22  Admitted from home    Assessment:   Patient is A&O x 4, communicative, continent with no assistance needed in repositioning. Bed: [place]  Diet: Adult diet regular  Patient pre-medicated for pain by RN. 1. POA left scrotal I & D site appears to have less edema and erythema, wound appears to be the same size, a little more yellow tissue in wound bed today, cleansed with gauze and cotton tipped applicator, wound edges are open, raghu-wound intact. Opticell AG and Mepitel One applied. 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 Patient will notify when partner is available so teaching can be done. Met with roommate, demonstrated wound care; he feels he will be able to apply dressing. Patient requested home health for assistance initially. Case management notified. Additional wound care supplies given to patient. Patient repositioned on right side. Recommendations:    Scrotal- Daily cleanse with normal saline, wipe wound bed clean and dry, loosely fill with Opticell AG and cover. NOTE:  When patient is discharged dressing will be done every other day. Skin Care & Pressure Prevention:  Minimize layers of linen/pads under patient to optimize support surface. Turn/reposition approximately every 2 hours and offload heels.   Manage incontinence / promote continence   Nourishing Skin Cream to dry skin, minimize use of briefs when able    Discussed above plan with patient & Robin Gómez RN    Transition of Care: Plan to follow as needed while admitted to hospital.    Maryam Walker \"Hillary\" CRISTOFER Purdy, RN, [facility].  office [phone]  pager [phone] or call  to page

## 2022-04-05 NOTE — DISCHARGE SUMMARY
Hospitalist Discharge Summary     Patient ID:  Elvira Chu  [id number]  61 y.o.  1965 4/1/2022    PCP on record: Cuca Gutierres MD    Admit date: 4/1/2022  Discharge date and time: 4/5/2022    DISCHARGE DIAGNOSIS:  Scrotal abscess with suprapubic cellulitis   Orchitis/Epididymitis  HIV  HCV    CONSULTATIONS:  IP CONSULT TO INFECTIOUS DISEASES    Excerpted HPI from H&P of Lauri Forman, DO:    61 y.o. male presents with 3 days duration of abrupt onset, gradually worsening, severe, constant, nonradiating, sharp, scrotal pain that is associated with subjective fevers. Patient denies exacerbating features  and denies remitting features.     ______________________________________________________________________  DISCHARGE SUMMARY/HOSPITAL COURSE:  for full details see H&P, daily progress notes, labs, consult notes. Scrotal abscess with suprapubic cellulitis; POA; s/p I & D  Orchitis/Epididymitis  - s/p I and D by urology on 4/2, following  - Wound culture growing MRSA    - Continue  Doxy/augmentin for 10 days at discharge  per ID   - urine Cx sensitivities on proteus noted. - Wound care consulted     HIV; On Biktarvy, f/u ID as OP  H/o HCV: Patient has achieved SVR. HCV RNA negative in 05/2021  Depression/anxiety - home zoloft, ativan   Elevated BP w/o HTN- prn clonidien while here. F/u PCP as OP       _______________________________________________________________________  Patient seen and examined by me on discharge day. Patient cleared by surgery and agreed to go home on p.o. antibiotics. Case management to arrange home health wound care. No other acute issues. Patient eager and adamant to go home. Patient verbalized understanding of discharge plan and instructions at this time.      _______________________________________________________________________  DISCHARGE MEDICATIONS:   Current Discharge Medication List      START taking these medications    Details amoxicillin-clavulanate (AUGMENTIN) 875-125 mg per tablet Take 1 Tablet by mouth two (2) times daily (with meals) for 20 doses. Qty: 20 Tablet, Refills: 0  Start date: 4/5/2022, End date: 4/15/2022      doxycycline (VIBRA-TABS) 100 mg tablet Take 1 Tablet by mouth every twelve (12) hours for 19 doses. Qty: 19 Tablet, Refills: 0  Start date: 4/5/2022, End date: 4/15/2022         CONTINUE these medications which have NOT CHANGED    Details   zhaadfbfv-mcythxlg-kxmnrnq ala (BIKTARVY) tab tablet Take 1 Tablet by mouth daily. sertraline (ZOLOFT) 100 mg tablet Take 200 mg by mouth daily. LORazepam (ATIVAN) 0.5 mg tablet Take 0.5 mg by mouth two (2) times a day. !! dextroamphetamine-amphetamine (ADDERALL) 20 mg tablet Take 20 mg by mouth daily. !! dextroamphetamine-amphetamine (ADDERALL) 20 mg tablet Take 20 mg by mouth daily as needed. Takes in the evening if necessary      cetirizine (ZYRTEC) 10 mg tablet Take 10 mg by mouth nightly. zolpidem (AMBIEN) 10 mg tablet Take 10 mg by mouth nightly. !! - Potential duplicate medications found. Please discuss with provider.             Patient Follow Up Instructions:   Diet cardiac  Activity as tolerated  Continue current local wound care as well surgery team  Return to ER or call 911 if symptoms reoccur or get worse  Check CBC & BMP at PCPs office next visit    Follow-up Information     Follow up With Specialties Details Why Contact Info    Lui Jimenez MD Family Medicine In 1 week Check CBC/ [address] [id number] [address], MD Urology In 5 days for f/u for wound care [address] YEHUDA Salgueor       Tyna Homans, MD Infectious Disease In 2 weeks  [address] [facility],[address]  [phone]          ________________________________________________________________    Risk of deterioration: Low    Condition at Discharge: Stable  __________________________________________________________________    Disposition  Home with family and home health services    ____________________________________________________________________    Code Status: Full Code  ___________________________________________________________________      Total time in minutes spent coordinating this discharge 35  minutes    Signed:  MD Siobhan Alonso

## 2022-04-05 NOTE — PROGRESS NOTES
Pharmacist Note - Vancomycin Dosing  Therapy day 4  Indication:  Scrotal cellulitis with abscess  Current regimen: 1250 mg Q12H    Recent Labs     04/05/22  0622 04/05/22  0352 04/04/22  1052 04/04/22  0333 04/03/22  1213 04/03/22  0510   WBC 6.9  --   --  6.4 10.2  --    CREA  --  1.13 1.24 1.27  --    < >   BUN  --  16 16 17  --    < >    < > = values in this interval not displayed. A random vancomycin level of 10.2 mcg/mL was obtained and from this level, the patient's AUC24 is calculated to be 381 with the current regimen. Goal target range AUC/-500      Plan: Change to 1000 mg Q12H. Pharmacy will continue to monitor this patient daily for changes in clinical status and renal function. *Random vancomycin levels are used to calculate AUC/ROZ, this level should not be interpreted as a trough. Vancomycin has been dosed using Bayesian kinetics software to target an AUC24:ROZ of 400-600, which provides adequate exposure for as assumed infection due to MRSA with an ROZ of 1 or less while reducing the risk of nephrotoxicity as seen with traditional trough based dosing goals.

## 2022-04-05 NOTE — PROGRESS NOTES
Care Management -     17:40 Received call from patient's RN. Patient has tried to call for a ride home but has been unable to reach anyone. 18:15 Spoke with patient on the phone. He said he has tried to reach out to his partner [place]. Rex's number is on his message ([phone]). He has called but did not receive an answer. He said we could call his friend Doreen Villatoro whose number is on the face sheet, but he does not have a key to get in.    18:20 Sent text message to patient's partner [place] ([phone]). Requested he call this CM. 18:31 Called Rex on same number. Left voicemail to call this CM by 8 PM or nurse's station after that.     18:36 Called patient's friend Doreen Villatoro ([phone]). She said she will come to  patient. She is aware he has no key but she will pick him up anyway until they can reach [place]. CM transferred her to patient's room and gave her direct number to patient's room. 18:41 Called unit. Updated RN who will update patient's RN.     LIZ Davis

## 2022-04-07 LAB
BACTERIA SPEC CULT: NORMAL
SERVICE CMNT-IMP: NORMAL

## 2022-04-08 NOTE — PROGRESS NOTES
Physician Progress Note      PATIENT:               Margoth Barkley  CSN #:                  [id number]  :                       1965  ADMIT DATE:       2022 9:32 PM  [address] DATE:        2022 7:25 PM  RESPONDING  PROVIDER #:        Marisela BRYSON MD          QUERY TEXT:    Pt admitted with scrotal abscess. Pt noted to have HIV with labs showing <20 assay. If possible, please clarify in progress notes and discharge summary the patient?s HIV status as: The medical record reflects the following:  Risk Factors: 55yo with HIV    Clinical Indicators:  HIV-1 RNA QT by PCR: <20    DCS  HIV; On Biktarvy, f/u ID as OP    Treatment: Biktarvy medication, plans to follow up with ID outpatient    Thank you,  Jo Foley  [phone]  Options provided:  -- Asymptomatic HIV  -- HIV disease with current HIV related illness  -- Other - I will add my own diagnosis  -- Disagree - Not applicable / Not valid  -- Disagree - Clinically unable to determine / Unknown  -- Refer to Clinical Documentation Reviewer    PROVIDER RESPONSE TEXT:    Provider is clinically unable to determine a response to this query.     Query created by: Caitie Lafleur on 2022 10:49 AM      Electronically signed by:  Cris Hussein MD 2022 2:31 PM

## 2022-04-14 NOTE — PROGRESS NOTES
Physician Progress Note      PATIENT:               Pancho Carmen  CSN #:                  [id number]  :                       1965  ADMIT DATE:       2022 9:32 PM  [address] DATE:        2022 7:25 PM  RESPONDING  PROVIDER #:        Jeanna MAZARIEGOS MD          QUERY TEXT:    Patient admitted with scrotal abscess. Per 2022 progress note an I&D was completed on the patient's scrotal abscess. Patient tolerated the procedure well and a culture was sent. To accurately reflect the procedure performed please further specify the depth of tissue incised and drained: The medical record reflects the following:  Risk Factors: 57yo with scrotal abscess    Clinical Indicators:   [employer]  1. Diffuse scrotal edema. Small, septated left hydrocele. 2. Enlarged, hypervascular and heterogeneous left epididymis and hypervascular left testicle, may represent acute epididymoorchitis. 3. Enlarged, hypervascular right epididymis may represent epididymitis. 4/ Urology  I and D- can do this at bedside now. Discussed the procedure with him - he understands this may help reverse the infection- he may need more formal debridement if this does not help  --  I and D of left scrotal abscess completed  Tolerated it well. Culture sent    Treatment: I&D performed at bedside with culture sent    Thank you,  Gurjit Almazan  [phone]  Options provided:  -- Skin only  -- Subcutaneous tissue  -- Fascia  -- Muscle  -- Other - I will add my own diagnosis  -- Disagree - Not applicable / Not valid  -- Disagree - Clinically unable to determine / Unknown  -- Refer to Clinical Documentation Reviewer    PROVIDER RESPONSE TEXT:    Addendum to 2022 procedure note The depth of the drainage to the scrotal abscess was down to and including subcutaneous tissue.     Query created by: Ella Castillo on 2022 10:56 AM      Electronically signed by:  Jeanna Gonzalez MD 2022 7:46 AM

## (undated) DEVICE — SURGICAL PROCEDURE PACK BASIN MAJ SET CUST NO CAUT

## (undated) DEVICE — SUTURE MCRYL SZ 3-0 L27IN ABSRB UD L19MM PS-2 3/8 CIR PRIM Y427H

## (undated) DEVICE — PACK,BASIC,SIRUS,V: Brand: MEDLINE

## (undated) DEVICE — SUT ETHLN 3-0 18IN PS1 BLK --

## (undated) DEVICE — 3M™ STERI-DRAPE™ U-DRAPE 1015: Brand: STERI-DRAPE™

## (undated) DEVICE — TOWEL SURG W17XL27IN STD BLU COT NONFENESTRATED PREWASHED

## (undated) DEVICE — PREP SKN PREVAIL 40ML APPL --

## (undated) DEVICE — ZIMMER® STERILE DISPOSABLE TOURNIQUET CUFF WITH PLC, DUAL PORT, SINGLE BLADDER, 34 IN. (86 CM)

## (undated) DEVICE — INFECTION CONTROL KIT SYS

## (undated) DEVICE — STRAP,POSITIONING,KNEE/BODY,FOAM,4X60": Brand: MEDLINE

## (undated) DEVICE — ROCKER SWITCH PENCIL BLADE ELECTRODE, HOLSTER: Brand: EDGE

## (undated) DEVICE — GOWN,SIRUS,NONRNF,SETINSLV,2XL,18/CS: Brand: MEDLINE

## (undated) DEVICE — DRESSING,GAUZE,XEROFORM,CURAD,1"X8",ST: Brand: CURAD

## (undated) DEVICE — DRAPE,EXTREMITY,89X128,STERILE: Brand: MEDLINE

## (undated) DEVICE — SPLINT ORTH W4XL15IN PLSTR OF PARIS LO EXOTHERM SMOOTH

## (undated) DEVICE — STERILE POLYISOPRENE POWDER-FREE SURGICAL GLOVES: Brand: PROTEXIS

## (undated) DEVICE — IMMOBILIZER HND L W24.13XL21.59CM ALUM RADPQ ALUMI-HND

## (undated) DEVICE — SPONGE GZ W4XL4IN COT 12 PLY TYP VII WVN C FLD DSGN

## (undated) DEVICE — SUTURE VCRL SZ 2-0 L27IN ABSRB UD L26MM SH 1/2 CIR J417H

## (undated) DEVICE — DRAPE,REIN 53X77,STERILE: Brand: MEDLINE

## (undated) DEVICE — REM POLYHESIVE ADULT PATIENT RETURN ELECTRODE: Brand: VALLEYLAB

## (undated) DEVICE — TUBING SUCT 10FR MAL ALUM SHFT FN CAP VENT UNIV CONN W/ OBT

## (undated) DEVICE — Device

## (undated) DEVICE — PADDING CAST 4 INX5 YD STRL

## (undated) DEVICE — SUTURE ETHLN SZ 3-0 L18IN NONABSORBABLE BLK L19MM PC-5 3/8 1893G

## (undated) DEVICE — ASTOUND STANDARD SURGICAL GOWN, XXL: Brand: CONVERTORS

## (undated) DEVICE — SOLUTION IV 1000ML 0.9% SOD CHL